# Patient Record
Sex: FEMALE | Race: BLACK OR AFRICAN AMERICAN | URBAN - METROPOLITAN AREA
[De-identification: names, ages, dates, MRNs, and addresses within clinical notes are randomized per-mention and may not be internally consistent; named-entity substitution may affect disease eponyms.]

---

## 2017-01-04 ENCOUNTER — CARE COORDINATION (OUTPATIENT)
Dept: CARDIOLOGY | Facility: CLINIC | Age: 62
End: 2017-01-04

## 2017-01-04 DIAGNOSIS — I42.9 CARDIOMYOPATHY, UNSPECIFIED (H): Primary | ICD-10-CM

## 2017-01-04 DIAGNOSIS — I34.0 MITRAL VALVE INSUFFICIENCY: ICD-10-CM

## 2017-01-04 RX ORDER — POTASSIUM CHLORIDE 1500 MG/1
40 TABLET, EXTENDED RELEASE ORAL
Status: CANCELLED | OUTPATIENT
Start: 2017-01-04

## 2017-01-04 RX ORDER — POTASSIUM CHLORIDE 1500 MG/1
20 TABLET, EXTENDED RELEASE ORAL
Status: CANCELLED | OUTPATIENT
Start: 2017-01-04

## 2017-01-04 RX ORDER — SODIUM CHLORIDE 9 MG/ML
INJECTION, SOLUTION INTRAVENOUS CONTINUOUS
Status: CANCELLED | OUTPATIENT
Start: 2017-01-04

## 2017-01-04 NOTE — PROGRESS NOTES
Patient was seen by Dr. Jossue Mirza  In clinic on Dec.19th for evaluation of HF. It was recommended that she have Coronary angiogram to evaluate cardiomyopathy and valve disease.  I had not heard back from family but spoke with them today. She is probably going to proceed with procedure after family checks with financial services.   Will place orders in Epic for when they call back to schedule

## 2017-02-17 ENCOUNTER — OFFICE VISIT (OUTPATIENT)
Dept: URGENT CARE | Facility: URGENT CARE | Age: 62
End: 2017-02-17
Payer: COMMERCIAL

## 2017-02-17 VITALS
BODY MASS INDEX: 30.42 KG/M2 | TEMPERATURE: 99.5 F | DIASTOLIC BLOOD PRESSURE: 68 MMHG | HEART RATE: 67 BPM | OXYGEN SATURATION: 97 % | SYSTOLIC BLOOD PRESSURE: 125 MMHG | WEIGHT: 161 LBS

## 2017-02-17 DIAGNOSIS — R50.9 FEVER, UNSPECIFIED: ICD-10-CM

## 2017-02-17 DIAGNOSIS — J02.0 STREP THROAT: Primary | ICD-10-CM

## 2017-02-17 LAB
DEPRECATED S PYO AG THROAT QL EIA: ABNORMAL
FLUAV+FLUBV AG SPEC QL: NORMAL
FLUAV+FLUBV AG SPEC QL: NORMAL
MICRO REPORT STATUS: ABNORMAL
SPECIMEN SOURCE: ABNORMAL
SPECIMEN SOURCE: NORMAL

## 2017-02-17 PROCEDURE — 87880 STREP A ASSAY W/OPTIC: CPT | Performed by: FAMILY MEDICINE

## 2017-02-17 PROCEDURE — 87804 INFLUENZA ASSAY W/OPTIC: CPT | Performed by: FAMILY MEDICINE

## 2017-02-17 PROCEDURE — 99213 OFFICE O/P EST LOW 20 MIN: CPT | Performed by: FAMILY MEDICINE

## 2017-02-17 RX ORDER — AMOXICILLIN 500 MG/1
500 CAPSULE ORAL 3 TIMES DAILY
Qty: 30 CAPSULE | Refills: 0 | Status: SHIPPED | OUTPATIENT
Start: 2017-02-17 | End: 2017-02-27

## 2017-02-17 NOTE — MR AVS SNAPSHOT
After Visit Summary   2/17/2017    Damon Gregory    MRN: [id number]           Patient Information     Date Of Birth          1955        Visit Information        Provider Department      2/17/2017 6:30 PM Kizzy Wong DO [facility]        Today's Diagnoses     Strep throat    -  1    Fever, unspecified          Care Instructions    Start the antibiotic tonight.    Home until Sunday morning.   New toothbrush after you've been on the antibiotic for at least 3 days.        Follow-ups after your visit        Who to contact     If you have questions or need follow up information about today's clinic visit or your schedule please contact [facility] directly at [phone].  Normal or non-critical lab and imaging results will be communicated to you by Yahoo!hart, letter or phone within 4 business days after the clinic has received the results. If you do not hear from us within 7 days, please contact the clinic through Yahoo!hart or phone. If you have a critical or abnormal lab result, we will notify you by phone as soon as possible.  Submit refill requests through EventMama or call your pharmacy and they will forward the refill request to us. Please allow 3 business days for your refill to be completed.          Additional Information About Your Visit        MyChart Information     EventMama gives you secure access to your electronic health record. If you see a primary care provider, you can also send messages to your care team and make appointments. If you have questions, please call your primary care clinic.  If you do not have a primary care provider, please call [phone] and they will assist you.        Care EveryWhere ID     This is your Care EveryWhere ID. This could be used by other organizations to access your [place] medical records  [id number]        Your Vitals Were     Pulse Temperature Pulse Oximetry BMI (Body Mass Index)          67 99.5   F (37.5  C) (Tympanic) 97% 30.42 kg/m2         Blood Pressure from Last 3 Encounters:   02/17/17 125/68   12/19/16 133/79   05/16/16 138/64    Weight from Last 3 Encounters:   02/17/17 161 lb (73 kg)   12/19/16 162 lb 6.4 oz (73.7 kg)   05/16/16 158 lb (71.7 kg)              We Performed the Following     Influenza A/B antigen     Strep, Rapid Screen          Today's Medication Changes          These changes are accurate as of: 2/17/17  8:32 PM.  If you have any questions, ask your nurse or doctor.               Start taking these medicines.        Dose/Directions    amoxicillin 500 MG capsule   Commonly known as:  AMOXIL   Used for:  Strep throat   Started by:  Kizzy Wong DO        Dose:  500 mg   Take 1 capsule (500 mg) by mouth 3 times daily for 10 days   Quantity:  30 capsule   Refills:  0         These medicines have changed or have updated prescriptions.        Dose/Directions    losartan 50 MG tablet   Commonly known as:  COZAAR   This may have changed:  how much to take   Used for:  Chronic congestive heart failure, unspecified congestive heart failure type (H)        Dose:  50 mg   Take 1 tablet (50 mg) by mouth daily   Quantity:  90 tablet   Refills:  3            Where to get your medicines      These medications were sent to [facility] [address] [facility] - [place], [place] - [address] [facility]  [address], [place] [address]     Phone:  [phone]     amoxicillin 500 MG capsule                Primary Care Provider Office Phone # Fax #    DESIREE Quiles [facility] [phone]       [facility] [address] [place] [address]        Thank you!     Thank you for choosing [facility]  for your care. Our goal is always to provide you with excellent care. Hearing back from our patients is one way we can continue to improve our services. Please take a few minutes to complete the written survey that you may receive in the mail after your  visit with us. Thank you!             Your Updated Medication List - Protect others around you: Learn how to safely use, store and throw away your medicines at www.disposemymeds.org.          This list is accurate as of: 2/17/17  8:32 PM.  Always use your most recent med list.                   Brand Name Dispense Instructions for use    amoxicillin 500 MG capsule    AMOXIL    30 capsule    Take 1 capsule (500 mg) by mouth 3 times daily for 10 days       aspirin 81 MG tablet      Take by mouth daily       furosemide 20 MG tablet    LASIX    90 tablet    Take 1 tablet (20 mg) by mouth daily Take 1 tab daily       losartan 50 MG tablet    COZAAR    90 tablet    Take 1 tablet (50 mg) by mouth daily       metoprolol 50 MG 24 hr tablet    TOPROL-XL    90 tablet    Take 1 tablet (50 mg) by mouth At Bedtime

## 2017-02-18 NOTE — PATIENT INSTRUCTIONS
Start the antibiotic tonight.    Home until Sunday morning.   New toothbrush after you've been on the antibiotic for at least 3 days.

## 2017-02-18 NOTE — NURSING NOTE
"Chief Complaint   Patient presents with     Urgent Care     Cough     c/o cough and sore throat for 5 days       Initial /68 (BP Location: Right arm, Patient Position: Chair, Cuff Size: Adult Regular)  Pulse 67  Temp 99.5  F (37.5  C) (Tympanic)  Wt 161 lb (73 kg)  SpO2 97%  BMI 30.42 kg/m2 Estimated body mass index is 30.42 kg/(m^2) as calculated from the following:    Height as of 12/19/16: 5' 1\" (1.549 m).    Weight as of this encounter: 161 lb (73 kg).  Medication Reconciliation: complete   Celena Valverde MA    "

## 2017-02-18 NOTE — PROGRESS NOTES
"SUBJECTIVE:   Damon Gregory is a 62 year old female presenting with a chief complaint of sore throat and Upper Respiratory/ENT symptoms:  Symptoms started 5 days ago and  include: feverish feeling (no temps taken at home), nasal congestion, rhinorrhea, \"cold symptoms\", cough , sore throat and myalgias  Course of illness is: same.    Treatment measures tried:  OTC meds.  Predisposing factors include:  Non smoker, no h/o asthma.     ROS:  5-Point Review of Systems Negative-- Except as stated above.    OBJECTIVE  /68 (BP Location: Right arm, Patient Position: Chair, Cuff Size: Adult Regular)  Pulse 67  Temp 99.5  F (37.5  C) (Tympanic)  Wt 161 lb (73 kg)  SpO2 97%  BMI 30.42 kg/m2  GENERAL:  Awake, alert and interactive. No acute distress.  HEENT:   NC/AT, EOMI, clear conjunctiva.  Clear nasal discharge.  Oropharynx with mild diffuse erythema,  moist and clear.  TM's and EAC's benign.  NECK: supple and free of adenopathy  CHEST:  Lungs are clear, no rhonchi, wheezing or rales. Normal symmetric air entry throughout both lung fields.   HEART:  S1 and S2 normal, no murmurs, clicks, gallops or rubs. Regular rate and rhythm.    Results for orders placed or performed in visit on 02/17/17   Strep, Rapid Screen   Result Value Ref Range    Specimen Description Throat     Rapid Strep A Screen (A)      POSITIVE: Group A Streptococcal antigen detected by immunoassay.    Micro Report Status FINAL 02/17/2017    Influenza A/B antigen   Result Value Ref Range    Influenza A/B Agn Specimen Nasopharyngeal     Influenza A  NEG     Negative   Test results must be correlated with clinical data. If necessary, results   should be confirmed by a molecular assay or viral culture.      Influenza B  NEG     Negative   Test results must be correlated with clinical data. If necessary, results   should be confirmed by a molecular assay or viral culture.           ASSESSMENT/PLAN    ICD-10-CM    1. Strep throat J02.0 amoxicillin (AMOXIL) 500 MG " capsule   2. Fever, unspecified R50.9 Strep, Rapid Screen     Influenza A/B antigen      We discussed the expected course and symptomatic cares in detail, including return to care if symptoms not improving as expected, do not resolve completely, or if any new or worsening symptoms develop.  Patient Instructions   Start the antibiotic tonight.    Home until Sunday morning.   New toothbrush after you've been on the antibiotic for at least 3 days.

## 2017-05-30 DIAGNOSIS — I50.9 CHRONIC CONGESTIVE HEART FAILURE, UNSPECIFIED CONGESTIVE HEART FAILURE TYPE: ICD-10-CM

## 2017-05-31 RX ORDER — LOSARTAN POTASSIUM 50 MG/1
50 TABLET ORAL DAILY
Qty: 30 TABLET | Refills: 1 | Status: SHIPPED | OUTPATIENT
Start: 2017-05-31 | End: 2017-10-18

## 2017-09-06 ENCOUNTER — OFFICE VISIT (OUTPATIENT)
Dept: URGENT CARE | Facility: URGENT CARE | Age: 62
End: 2017-09-06
Payer: COMMERCIAL

## 2017-09-06 VITALS
HEART RATE: 82 BPM | BODY MASS INDEX: 30.21 KG/M2 | TEMPERATURE: 99.2 F | WEIGHT: 160 LBS | DIASTOLIC BLOOD PRESSURE: 66 MMHG | OXYGEN SATURATION: 100 % | HEIGHT: 61 IN | SYSTOLIC BLOOD PRESSURE: 125 MMHG

## 2017-09-06 DIAGNOSIS — J06.9 VIRAL UPPER RESPIRATORY TRACT INFECTION: Primary | ICD-10-CM

## 2017-09-06 PROCEDURE — 99214 OFFICE O/P EST MOD 30 MIN: CPT | Performed by: PHYSICIAN ASSISTANT

## 2017-09-06 RX ORDER — AZITHROMYCIN 250 MG/1
250 TABLET, FILM COATED ORAL DAILY
Qty: 6 TABLET | Refills: 0 | Status: SHIPPED | OUTPATIENT
Start: 2017-09-06 | End: 2017-10-18

## 2017-09-06 NOTE — MR AVS SNAPSHOT
"              After Visit Summary   9/6/2017    Damon Gregory    MRN: [id number]           Patient Information     Date Of Birth          1955        Visit Information        Provider Department      9/6/2017 5:15 PM Payal Delacruz PA-C [facility]        Today's Diagnoses     Viral upper respiratory tract infection    -  1       Follow-ups after your visit        Who to contact     If you have questions or need follow up information about today's clinic visit or your schedule please contact [facility] directly at [phone].  Normal or non-critical lab and imaging results will be communicated to you by CHOOMOGOhart, letter or phone within 4 business days after the clinic has received the results. If you do not hear from us within 7 days, please contact the clinic through [employer] or phone. If you have a critical or abnormal lab result, we will notify you by phone as soon as possible.  Submit refill requests through DUHEM or call your pharmacy and they will forward the refill request to us. Please allow 3 business days for your refill to be completed.          Additional Information About Your Visit        MyChart Information     DUHEM gives you secure access to your electronic health record. If you see a primary care provider, you can also send messages to your care team and make appointments. If you have questions, please call your primary care clinic.  If you do not have a primary care provider, please call [phone] and they will assist you.        Care EveryWhere ID     This is your Care EveryWhere ID. This could be used by other organizations to access your [place] medical records  [id number]        Your Vitals Were     Pulse Temperature Height Pulse Oximetry BMI (Body Mass Index)       82 99.2  F (37.3  C) (Tympanic) 5' 1\" (1.549 m) 100% 30.23 kg/m2        Blood Pressure from Last 3 Encounters:   09/06/17 125/66   02/17/17 125/68   12/19/16 " 133/79    Weight from Last 3 Encounters:   09/06/17 160 lb (72.6 kg)   02/17/17 161 lb (73 kg)   12/19/16 162 lb 6.4 oz (73.7 kg)              Today, you had the following     No orders found for display         Today's Medication Changes          These changes are accurate as of: 9/6/17  6:13 PM.  If you have any questions, ask your nurse or doctor.               Start taking these medicines.        Dose/Directions    azithromycin 250 MG tablet   Commonly known as:  ZITHROMAX   Used for:  Viral upper respiratory tract infection   Started by:  Payal Delacruz PA-C        Dose:  250 mg   Take 1 tablet (250 mg) by mouth daily Two tabs the first day then finish remaining pack with one tab daily   Quantity:  6 tablet   Refills:  0            Where to get your medicines      These medications were sent to [facility] [address] [facility] - [place], [place] - [address] [facility]  [address], [place] [address]     Phone:  [phone]     azithromycin 250 MG tablet                Primary Care Provider Office Phone # Fax #    Aileen Pizano, DESIREE [facility] [phone]       [address] [place] [address]        Equal Access to Services     CRISTINA COOK AH: Franklyn foxo Somargy, waaxda luqadaha, qaybta kaalmada adeegyada, haley frazier. So [facility] [phone].    ATENCIÓN: Si habla español, tiene a doshi disposición servicios gratuitos de asistencia lingüística. Llame al [phone].    We comply with applicable federal civil rights laws and [place] laws. We do not discriminate on the basis of race, color, national origin, age, disability sex, sexual orientation or gender identity.            Thank you!     Thank you for choosing [facility]  for your care. Our goal is always to provide you with excellent care. Hearing back from our patients is one way we can continue to improve our services. Please take a few minutes to complete the written  survey that you may receive in the mail after your visit with us. Thank you!             Your Updated Medication List - Protect others around you: Learn how to safely use, store and throw away your medicines at www.disposemymeds.org.          This list is accurate as of: 9/6/17  6:13 PM.  Always use your most recent med list.                   Brand Name Dispense Instructions for use Diagnosis    aspirin 81 MG tablet      Take by mouth daily        azithromycin 250 MG tablet    ZITHROMAX    6 tablet    Take 1 tablet (250 mg) by mouth daily Two tabs the first day then finish remaining pack with one tab daily    Viral upper respiratory tract infection       furosemide 20 MG tablet    LASIX    90 tablet    Take 1 tablet (20 mg) by mouth daily Take 1 tab daily    Chronic congestive heart failure, unspecified congestive heart failure type (H)       losartan 50 MG tablet    COZAAR    30 tablet    Take 1 tablet (50 mg) by mouth daily . APPOINTMENT REQUIRED PRIOR TO ADD'L REFILLS.    Chronic congestive heart failure, unspecified congestive heart failure type (H)       metoprolol 50 MG 24 hr tablet    TOPROL-XL    90 tablet    Take 1 tablet (50 mg) by mouth At Bedtime    Chronic systolic congestive heart failure (H)

## 2017-09-06 NOTE — PROGRESS NOTES
"SUBJECTIVE:   Damon Gregory is a 62 year old female presenting with a chief complaint of \"cold symptoms\" x 4 days.  She presents with her  who aids in the hx.   The illness started with a sore throat and runny nose, but then moved into her chest and now she is coughing and producing phlegm, mostly clear or white.   Still has ST.   No ear pain.   No sinus pain, pressure or runny nose.   Mild fever/felt slightly feverish at home but patient is not sure bc she did not take her temperature.   Treatment measures tried include Fluids, Soups, OTC antihistamine.   Patient is actually feeling a bit better today.  is not sick.     Past Medical History:   Diagnosis Date     Asthma      Cardiomyopathy, unspecified (H) / EF40% 10/5/2015     CHF (congestive heart failure) (H)      Hyperlipidemia      Kidney stone 10/2013     Lipoma 2000     Menopause      Mitral valve insufficiency 10/5/2015     Vitamin D deficiency      Current Outpatient Prescriptions   Medication Sig Dispense Refill     azithromycin (ZITHROMAX) 250 MG tablet Take 1 tablet (250 mg) by mouth daily Two tabs the first day then finish remaining pack with one tab daily 6 tablet 0     losartan (COZAAR) 50 MG tablet Take 1 tablet (50 mg) by mouth daily . APPOINTMENT REQUIRED PRIOR TO ADD'L REFILLS. 30 tablet 1     metoprolol (TOPROL-XL) 50 MG 24 hr tablet Take 1 tablet (50 mg) by mouth At Bedtime 90 tablet 3     aspirin 81 MG tablet Take by mouth daily       furosemide (LASIX) 20 MG tablet Take 1 tablet (20 mg) by mouth daily Take 1 tab daily 90 tablet 3     Social History   Substance Use Topics     Smoking status: Never Smoker     Smokeless tobacco: Never Used     Alcohol use No       ROS:  CONSTITUTIONAL:NEGATIVE for fever, chills, change in weight    OBJECTIVE  :/66  Pulse 82  Temp 99.2  F (37.3  C) (Tympanic)  Ht 5' 1\" (1.549 m)  Wt 160 lb (72.6 kg)  SpO2 100%  BMI 30.23 kg/m2  GENERAL APPEARANCE: healthy, alert and no distress  EYES: " EOMI,  PERRL, conjunctiva clear  HENT: ear canals and TM's normal.  Nose and mouth without ulcers, erythema or lesions  NECK: supple, nontender, no lymphadenopathy  RESP: lungs clear to auscultation - no rales, rhonchi or wheezes  CV: regular rates and rhythm, normal S1 S2, no murmur noted  ABDOMEN:  soft, nontender, no HSM or masses and bowel sounds normal  NEURO: Normal strength and tone, sensory exam grossly normal,  normal speech and mentation  SKIN: no suspicious lesions or rashes    ASSESSMENT:  Upper respiratory infection  Low grade fever 2/2 above     PLAN:  1.  Patient is to use Zpack if she does not improve in 1-2 days, rx provided  2.  Continue with supportive cares including cool mist vaporizer, OTC decongestant/antihistamines, saline gargles, saline nasal spray   3.  Handout from EPIC given on tx of URIs

## 2017-09-06 NOTE — NURSING NOTE
"Chief Complaint   Patient presents with     Urgent Care     Cough     c/o cough,congestion and HA for 4 days       Initial /66  Pulse 82  Temp 99.2  F (37.3  C) (Tympanic)  Ht 5' 1\" (1.549 m)  Wt 160 lb (72.6 kg)  SpO2 100%  BMI 30.23 kg/m2 Estimated body mass index is 30.23 kg/(m^2) as calculated from the following:    Height as of this encounter: 5' 1\" (1.549 m).    Weight as of this encounter: 160 lb (72.6 kg).  Medication Reconciliation: complete   Celena Valverde MA    "

## 2017-09-27 ENCOUNTER — TELEPHONE (OUTPATIENT)
Dept: CARDIOLOGY | Facility: CLINIC | Age: 62
End: 2017-09-27

## 2017-09-27 NOTE — TELEPHONE ENCOUNTER
Nate from call center calling to get an okay to schedule an appt with Dr. Mirza on 1/22/17.  Per protocol the call center needs permission from nurse.  Please call patient with a date she can see Dr. Mirza.

## 2017-10-17 PROCEDURE — 82274 ASSAY TEST FOR BLOOD FECAL: CPT | Performed by: NURSE PRACTITIONER

## 2017-10-18 ENCOUNTER — OFFICE VISIT (OUTPATIENT)
Dept: FAMILY MEDICINE | Facility: CLINIC | Age: 62
End: 2017-10-18
Payer: COMMERCIAL

## 2017-10-18 VITALS
SYSTOLIC BLOOD PRESSURE: 158 MMHG | TEMPERATURE: 97 F | HEIGHT: 61 IN | BODY MASS INDEX: 29.83 KG/M2 | RESPIRATION RATE: 20 BRPM | OXYGEN SATURATION: 98 % | WEIGHT: 158 LBS | HEART RATE: 56 BPM | DIASTOLIC BLOOD PRESSURE: 72 MMHG

## 2017-10-18 DIAGNOSIS — Z23 NEED FOR PROPHYLACTIC VACCINATION WITH TETANUS-DIPHTHERIA (TD): ICD-10-CM

## 2017-10-18 DIAGNOSIS — E78.5 HYPERLIPIDEMIA, UNSPECIFIED HYPERLIPIDEMIA TYPE: ICD-10-CM

## 2017-10-18 DIAGNOSIS — Z00.00 ROUTINE GENERAL MEDICAL EXAMINATION AT A HEALTH CARE FACILITY: Primary | ICD-10-CM

## 2017-10-18 DIAGNOSIS — Z12.11 SCREEN FOR COLON CANCER: ICD-10-CM

## 2017-10-18 DIAGNOSIS — Z12.31 VISIT FOR SCREENING MAMMOGRAM: ICD-10-CM

## 2017-10-18 DIAGNOSIS — Z11.59 NEED FOR HEPATITIS C SCREENING TEST: ICD-10-CM

## 2017-10-18 DIAGNOSIS — I50.22 CHRONIC SYSTOLIC CONGESTIVE HEART FAILURE (H): ICD-10-CM

## 2017-10-18 DIAGNOSIS — Z23 NEED FOR PROPHYLACTIC VACCINATION AND INOCULATION AGAINST INFLUENZA: ICD-10-CM

## 2017-10-18 DIAGNOSIS — Z23 NEED FOR PROPHYLACTIC VACCINATION AGAINST STREPTOCOCCUS PNEUMONIAE (PNEUMOCOCCUS): ICD-10-CM

## 2017-10-18 DIAGNOSIS — I50.9 CHRONIC CONGESTIVE HEART FAILURE, UNSPECIFIED CONGESTIVE HEART FAILURE TYPE: ICD-10-CM

## 2017-10-18 LAB
CREAT UR-MCNC: 105 MG/DL
HCV AB SERPL QL IA: NONREACTIVE
MICROALBUMIN UR-MCNC: 8 MG/L
MICROALBUMIN/CREAT UR: 7.49 MG/G CR (ref 0–25)

## 2017-10-18 PROCEDURE — 36415 COLL VENOUS BLD VENIPUNCTURE: CPT | Performed by: NURSE PRACTITIONER

## 2017-10-18 PROCEDURE — 80061 LIPID PANEL: CPT | Performed by: NURSE PRACTITIONER

## 2017-10-18 PROCEDURE — 99396 PREV VISIT EST AGE 40-64: CPT | Performed by: NURSE PRACTITIONER

## 2017-10-18 PROCEDURE — 82043 UR ALBUMIN QUANTITATIVE: CPT | Performed by: NURSE PRACTITIONER

## 2017-10-18 PROCEDURE — 86803 HEPATITIS C AB TEST: CPT | Performed by: NURSE PRACTITIONER

## 2017-10-18 PROCEDURE — 80053 COMPREHEN METABOLIC PANEL: CPT | Performed by: NURSE PRACTITIONER

## 2017-10-18 RX ORDER — LOSARTAN POTASSIUM 50 MG/1
50 TABLET ORAL DAILY
Qty: 90 TABLET | Refills: 3 | Status: SHIPPED | OUTPATIENT
Start: 2017-10-18 | End: 2018-11-24

## 2017-10-18 RX ORDER — FUROSEMIDE 20 MG
20 TABLET ORAL DAILY
Qty: 90 TABLET | Refills: 3 | Status: SHIPPED | OUTPATIENT
Start: 2017-10-18 | End: 2019-04-16

## 2017-10-18 RX ORDER — METOPROLOL SUCCINATE 50 MG/1
50 TABLET, EXTENDED RELEASE ORAL AT BEDTIME
Qty: 90 TABLET | Refills: 3 | Status: SHIPPED | OUTPATIENT
Start: 2017-10-18 | End: 2018-12-31

## 2017-10-18 RX ORDER — LOSARTAN POTASSIUM 50 MG/1
50 TABLET ORAL DAILY
Qty: 30 TABLET | Refills: 1 | Status: CANCELLED | OUTPATIENT
Start: 2017-10-18

## 2017-10-18 NOTE — MR AVS SNAPSHOT
After Visit Summary   10/18/2017    Damon Gregory    MRN: [id number]           Patient Information     Date Of Birth          1955        Visit Information        Provider Department      10/18/2017 8:15 AM Aileen Pizano APRN CNP; [employer] [facility]        Today's Diagnoses     Routine general medical examination at a health care facility    -  1    Chronic congestive heart failure, unspecified congestive heart failure type (H)        Screen for colon cancer        Visit for screening mammogram        Need for hepatitis C screening test        Need for prophylactic vaccination and inoculation against influenza        Need for prophylactic vaccination against Streptococcus pneumoniae (pneumococcus)        Need for prophylactic vaccination with tetanus-diphtheria (TD)          Care Instructions    Keep taking your medications as prescribed.  I would recommend that you continue your appointments with the cardiologist.  Return to the clinic to see me in 6 months for a recheck, sooner if problems.          Preventive Health Recommendations  Female Ages 50 - 64    Yearly exam: See your health care provider every year in order to  o Review health changes.   o Discuss preventive care.    o Review your medicines if your doctor has prescribed any.      Get a Pap test every three years (unless you have an abnormal result and your provider advises testing more often).    If you get Pap tests with HPV test, you only need to test every 5 years, unless you have an abnormal result.     You do not need a Pap test if your uterus was removed (hysterectomy) and you have not had cancer.    You should be tested each year for STDs (sexually transmitted diseases) if you're at risk.     Have a mammogram every 1 to 2 years.    Have a colonoscopy at age 50, or have a yearly FIT test (stool test). These exams screen for colon cancer.      Have a cholesterol test every 5 years, or  more often if advised.    Have a diabetes test (fasting glucose) every three years. If you are at risk for diabetes, you should have this test more often.     If you are at risk for osteoporosis (brittle bone disease), think about having a bone density scan (DEXA).    Shots: Get a flu shot each year. Get a tetanus shot every 10 years.    Nutrition:     Eat at least 5 servings of fruits and vegetables each day.    Eat whole-grain bread, whole-wheat pasta and brown rice instead of white grains and rice.    Talk to your provider about Calcium and Vitamin D.     Lifestyle    Exercise at least 150 minutes a week (30 minutes a day, 5 days a week). This will help you control your weight and prevent disease.    Limit alcohol to one drink per day.    No smoking.     Wear sunscreen to prevent skin cancer.     See your dentist every six months for an exam and cleaning.    See your eye doctor every 1 to 2 years.            Follow-ups after your visit        Your next 10 appointments already scheduled     Dec 11, 2017  2:00 PM CST   (Arrive by 1:45 PM)   RETURN HEART FAILURE with Flash Mirza MD   [facility] ([facility])    [address] [place] [id number] [phone]              Future tests that were ordered for you today     Open Future Orders        Priority Expected Expires Ordered    MA SCREENING DIGITAL BILAT - Future  (s+30) Routine  10/18/2018 10/18/2017    Fecal colorectal cancer screen (FIT) Routine 11/8/2017 1/10/2018 10/18/2017            Who to contact     If you have questions or need follow up information about today's clinic visit or your schedule please contact [facility] directly at [phone].  Normal or non-critical lab and imaging results will be communicated to you by MyChart, letter or phone within 4 business days after the clinic has received the results. If you do not hear from us within 7 days, please contact  "the clinic through [employer] or phone. If you have a critical or abnormal lab result, we will notify you by phone as soon as possible.  Submit refill requests through [employer] or call your pharmacy and they will forward the refill request to us. Please allow 3 business days for your refill to be completed.          Additional Information About Your Visit        [employer] Information     [employer] gives you secure access to your electronic health record. If you see a primary care provider, you can also send messages to your care team and make appointments. If you have questions, please call your primary care clinic.  If you do not have a primary care provider, please call [phone] and they will assist you.        Care EveryWhere ID     This is your Care EveryWhere ID. This could be used by other organizations to access your [facility] medical records  [id number]        Your Vitals Were     Pulse Temperature Respirations Height Pulse Oximetry Breastfeeding?    56 97  F (36.1  C) (Oral) 20 5' 1\" (1.549 m) 98% No    BMI (Body Mass Index)                   29.85 kg/m2            Blood Pressure from Last 3 Encounters:   10/18/17 160/73   09/06/17 125/66   02/17/17 125/68    Weight from Last 3 Encounters:   10/18/17 158 lb (71.7 kg)   09/06/17 160 lb (72.6 kg)   02/17/17 161 lb (73 kg)              We Performed the Following     Albumin Random Urine Quantitative with Creat Ratio     Comprehensive metabolic panel     Hepatitis C Screen Reflex to HCV RNA Quant and Genotype     Lipid panel reflex to direct LDL        Primary Care Provider Office Phone # Fax #    DESIREE Quiles [facility] [phone] [address] [place] [address]        Equal Access to Services     VIVIANA COOK : Hadii tushar Melvin, tyrone fay, haley lucas. So [facility] [phone].    ATENCIÓN: Si habla español, tiene a doshi disposición servicios " mary ann de asistencia lingüística. Emili sanford [phone].    We comply with applicable federal civil rights laws and [place] laws. We do not discriminate on the basis of race, color, national origin, age, disability, sex, sexual orientation, or gender identity.            Thank you!     Thank you for choosing [facility]  for your care. Our goal is always to provide you with excellent care. Hearing back from our patients is one way we can continue to improve our services. Please take a few minutes to complete the written survey that you may receive in the mail after your visit with us. Thank you!             Your Updated Medication List - Protect others around you: Learn how to safely use, store and throw away your medicines at www.disposemymeds.org.          This list is accurate as of: 10/18/17  9:00 AM.  Always use your most recent med list.                   Brand Name Dispense Instructions for use Diagnosis    aspirin 81 MG tablet      Take by mouth daily        furosemide 20 MG tablet    LASIX    90 tablet    Take 1 tablet (20 mg) by mouth daily Take 1 tab daily    Chronic congestive heart failure, unspecified congestive heart failure type (H)       losartan 50 MG tablet    COZAAR    30 tablet    Take 1 tablet (50 mg) by mouth daily . APPOINTMENT REQUIRED PRIOR TO ADD'L REFILLS.    Chronic congestive heart failure, unspecified congestive heart failure type (H)       metoprolol 50 MG 24 hr tablet    TOPROL-XL    90 tablet    Take 1 tablet (50 mg) by mouth At Bedtime    Chronic systolic congestive heart failure (H)

## 2017-10-18 NOTE — NURSING NOTE
"Chief Complaint   Patient presents with     Physical       Initial /73  Pulse 56  Temp 97  F (36.1  C) (Oral)  Resp 20  Ht 5' 1\" (1.549 m)  Wt 158 lb (71.7 kg)  SpO2 98%  Breastfeeding? No  BMI 29.85 kg/m2 Estimated body mass index is 29.85 kg/(m^2) as calculated from the following:    Height as of this encounter: 5' 1\" (1.549 m).    Weight as of this encounter: 158 lb (71.7 kg).  Medication Reconciliation: complete    Juan Luis Watson MA       "

## 2017-10-18 NOTE — PATIENT INSTRUCTIONS
Keep taking your medications as prescribed.  I would recommend that you continue your appointments with the cardiologist.  Return to the clinic to see me in 6 months for a recheck, sooner if problems.          Preventive Health Recommendations  Female Ages 50 - 64    Yearly exam: See your health care provider every year in order to  o Review health changes.   o Discuss preventive care.    o Review your medicines if your doctor has prescribed any.      Get a Pap test every three years (unless you have an abnormal result and your provider advises testing more often).    If you get Pap tests with HPV test, you only need to test every 5 years, unless you have an abnormal result.     You do not need a Pap test if your uterus was removed (hysterectomy) and you have not had cancer.    You should be tested each year for STDs (sexually transmitted diseases) if you're at risk.     Have a mammogram every 1 to 2 years.    Have a colonoscopy at age 50, or have a yearly FIT test (stool test). These exams screen for colon cancer.      Have a cholesterol test every 5 years, or more often if advised.    Have a diabetes test (fasting glucose) every three years. If you are at risk for diabetes, you should have this test more often.     If you are at risk for osteoporosis (brittle bone disease), think about having a bone density scan (DEXA).    Shots: Get a flu shot each year. Get a tetanus shot every 10 years.    Nutrition:     Eat at least 5 servings of fruits and vegetables each day.    Eat whole-grain bread, whole-wheat pasta and brown rice instead of white grains and rice.    Talk to your provider about Calcium and Vitamin D.     Lifestyle    Exercise at least 150 minutes a week (30 minutes a day, 5 days a week). This will help you control your weight and prevent disease.    Limit alcohol to one drink per day.    No smoking.     Wear sunscreen to prevent skin cancer.     See your dentist every six months for an exam and  cleaning.    See your eye doctor every 1 to 2 years.

## 2017-10-18 NOTE — PROGRESS NOTES
SUBJECTIVE:   CC: Damon Gregory is an 62 year old woman who presents for preventive health visit.     Physical   Annual:     Getting at least 3 servings of Calcium per day::  Yes    Bi-annual eye exam::  NO    Dental care twice a year::  Yes    Sleep apnea or symptoms of sleep apnea::  None    Taking medications regularly::  Yes    Additional concerns today::  YES      Today's PHQ-2 Score:   PHQ-2 ( 1999 [employer]) 10/18/2017   Q1: Little interest or pleasure in doing things 0   Q2: Feeling down, depressed or hopeless 0   PHQ-2 Score 0   Q1: Little interest or pleasure in doing things Not at all   Q2: Feeling down, depressed or hopeless Not at all   PHQ-2 Score 0       Abuse: Current or Past(Physical, Sexual or Emotional)- No  Do you feel safe in your environment - Yes    Social History   Substance Use Topics     Smoking status: Never Smoker     Smokeless tobacco: Never Used     Alcohol use No     The patient does not drink >3 drinks per day nor >7 drinks per week.    Reviewed orders with patient.  Reviewed health maintenance and updated orders accordingly - Yes  Labs reviewed in EPIC  BP Readings from Last 3 Encounters:   10/18/17 160/73   09/06/17 125/66   02/17/17 125/68    Wt Readings from Last 3 Encounters:   10/18/17 158 lb (71.7 kg)   09/06/17 160 lb (72.6 kg)   02/17/17 161 lb (73 kg)                Patient Active Problem List   Diagnosis     Chronic congestive heart failure, unspecified congestive heart failure type (H)     Multiple joint pain     CHF (congestive heart failure) (H)     Hyperlipidemia     Cardiomyopathy, unspecified (H) / EF40%     Mitral valve insufficiency/ mod to severe     History reviewed. No pertinent surgical history.    Social History   Substance Use Topics     Smoking status: Never Smoker     Smokeless tobacco: Never Used     Alcohol use No     History reviewed. No pertinent family history.      Current Outpatient Prescriptions   Medication Sig Dispense Refill     losartan (COZAAR)  50 MG tablet Take 1 tablet (50 mg) by mouth daily . APPOINTMENT REQUIRED PRIOR TO ADD'L REFILLS. 30 tablet 1     metoprolol (TOPROL-XL) 50 MG 24 hr tablet Take 1 tablet (50 mg) by mouth At Bedtime 90 tablet 3     aspirin 81 MG tablet Take by mouth daily       furosemide (LASIX) 20 MG tablet Take 1 tablet (20 mg) by mouth daily Take 1 tab daily 90 tablet 3     No Known Allergies  Recent Labs   Lab Test  05/18/16   1223  03/09/16   0837  10/01/15   LDL   --   115*   --   117   HDL   --   41*   --   42   TRIG   --   127   --   73   ALT   --   41   --   31   CR  0.73  0.74   < >  0.73   GFRESTIMATED  81  79   --    --    GFRESTBLACK  >90   GFR Calc    >90   GFR Calc     --    --    POTASSIUM  4.2  3.9   < >  4.5    < > = values in this interval not displayed.        Patient over age 50, mutual decision to screen reflected in health maintenance.      Pertinent mammograms are reviewed under the imaging tab.  History of abnormal Pap smear: Last 3 Pap Results: No results found for: PAP  She refuses pap smears.    CHF:  She is taking her medications as prescribed (lasix, losartan, and toprol 50) as well as a baby aspirin per day.  No chest pain.  She reports she is feeling healthy.  She denies SOB with activity or pedal edema.    Breakfast today at 5am: a small amount of oatmeal.       Reviewed and updated as needed this visit by clinical staff  Tobacco  Allergies  Med Hx  Surg Hx  Fam Hx  Soc Hx        Reviewed and updated as needed this visit by Provider          Review of Systems  C: NEGATIVE for fever, chills  I: NEGATIVE for worrisome rashes, moles or lesions  E: NEGATIVE for vision changes or irritation  ENT: NEGATIVE for ear, mouth and throat problems  R: NEGATIVE for significant cough or SOB  B: NEGATIVE for masses, tenderness or discharge  CV: NEGATIVE for chest pain, palpitations or peripheral edema  GI: NEGATIVE for nausea, abdominal pain, heartburn, or change in bowel  "habits  : NEGATIVE for unusual urinary or vaginal symptoms. No vaginal bleeding.  M: NEGATIVE for significant arthralgias or myalgia  N: NEGATIVE for weakness, dizziness or paresthesias  E: NEGATIVE for temperature intolerance, skin/hair changes  P: NEGATIVE for changes in mood or affect      OBJECTIVE:   /73  Pulse 56  Temp 97  F (36.1  C) (Oral)  Resp 20  Ht 5' 1\" (1.549 m)  Wt 158 lb (71.7 kg)  SpO2 98%  Breastfeeding? No  BMI 29.85 kg/m2  Physical Exam  GENERAL: healthy, alert and no distress  EYES: Eyes grossly normal to inspection, PERRL and conjunctivae and sclerae normal  HENT: ear canals and TM's normal, nose and mouth without ulcers or lesions  NECK: no adenopathy, no asymmetry, masses, or scars and thyroid normal to palpation  RESP: lungs clear to auscultation - no rales, rhonchi or wheezes  BREAST: normal without masses, tenderness or nipple discharge and no palpable axillary masses or adenopathy  CV: regular rate and rhythm, normal S1 S2, no S3 or S4, no murmur, click or rub, no peripheral edema and peripheral pulses strong  ABDOMEN: soft, nontender, no hepatosplenomegaly, no masses and bowel sounds normal   (female): Refused by patient  MS: no gross musculoskeletal defects noted, no edema  SKIN: no suspicious lesions or rashes  NEURO: Normal strength and tone, mentation intact and speech normal  PSYCH: mentation appears normal, affect normal/bright    ASSESSMENT/PLAN:   (Z00.00) Routine general medical examination at a health care facility  (primary encounter diagnosis)  Comment: routine  Plan: Hepatitis C Screen Reflex to HCV RNA Quant and         Genotype, Lipid panel reflex to direct LDL,         Comprehensive metabolic panel, Fecal colorectal        cancer screen (FIT), Albumin Random Urine         Quantitative with Creat Ratio        Follow-up appointment with me in 6 months for check in.    (I50.9) Chronic congestive heart failure, unspecified congestive heart failure type " (H)  Comment: uncertain  Plan: Continue care with cardiology  It was noted and discussed with the patient that her blood pressure is elevated today. Per the patient, she did not take her blood pressure medication today. She thought she was not supposed to take it before her appointment. I emphasized the importance of taking her medication every day.  I did instruct her to take her medication prior to all of her doctors appointments.  She will also take her medication before her upcoming cardiology clinic and she will also take her blood pressure/medication bottles to that appointment with her.  She is to return to the clinic to see me for a follow-up appointment in 6 months, sooner if problems.    (Z12.11) Screen for colon cancer  Comment: Routine  Plan: The patient declines/refuses a colonoscopy.  She does consent to the fit test. She is to do with the FIT at earliest convenience.  If negative for blood, next FIT test in one year.  If positive for blood, referral for colonoscopy.  Patient verbalizes understanding.      (Z12.31) Visit for screening mammogram  Comment: Routine  Plan: MA SCREENING DIGITAL BILAT - Future  (s+30)        She refuses a mammogram.    (Z11.59) Need for hepatitis C screening test  Comment: Routine  Plan: Hepatitis C Screen Reflex to HCV RNA Quant and         Genotype        Screening done today    (Z23) Need for prophylactic vaccination and inoculation against influenza  Comment:   Plan: Declined today    (Z23) Need for prophylactic vaccination against Streptococcus pneumoniae (pneumococcus)  Comment:   Plan: Declined today    (Z23) Need for prophylactic vaccination with tetanus-diphtheria (TD)  Comment:   Plan: Declined today    (E78.5) Hyperlipidemia, unspecified hyperlipidemia type  Comment:   Plan: Labs pending        COUNSELING:  Reviewed preventive health counseling, as reflected in patient instructions         reports that she has never smoked. She has never used smokeless  "tobacco.    Estimated body mass index is 29.85 kg/(m^2) as calculated from the following:    Height as of this encounter: 5' 1\" (1.549 m).    Weight as of this encounter: 158 lb (71.7 kg).   Weight management plan: Discussed healthy diet and exercise guidelines and patient will follow up in 6 months in clinic to re-evaluate.    Counseling Resources:  ATP IV Guidelines  Pooled Cohorts Equation Calculator  Breast Cancer Risk Calculator  FRAX Risk Assessment  ICSI Preventive Guidelines  Dietary Guidelines for Americans, 2010  USDA's MyPlate  ASA Prophylaxis  Lung CA Screening    DESIREE Best CNP  [facility]  Answers for HPI/ROS submitted by the patient on 10/18/2017   PHQ-2 Score:   "

## 2017-10-19 LAB
ALBUMIN SERPL-MCNC: 3.8 G/DL (ref 3.4–5)
ALP SERPL-CCNC: 67 U/L (ref 40–150)
ALT SERPL W P-5'-P-CCNC: 27 U/L (ref 0–50)
ANION GAP SERPL CALCULATED.3IONS-SCNC: 7 MMOL/L (ref 3–14)
AST SERPL W P-5'-P-CCNC: 15 U/L (ref 0–45)
BILIRUB SERPL-MCNC: 0.3 MG/DL (ref 0.2–1.3)
BUN SERPL-MCNC: 17 MG/DL (ref 7–30)
CALCIUM SERPL-MCNC: 9.2 MG/DL (ref 8.5–10.1)
CHLORIDE SERPL-SCNC: 105 MMOL/L (ref 94–109)
CHOLEST SERPL-MCNC: 204 MG/DL
CO2 SERPL-SCNC: 28 MMOL/L (ref 20–32)
CREAT SERPL-MCNC: 0.71 MG/DL (ref 0.52–1.04)
GFR SERPL CREATININE-BSD FRML MDRD: 84 ML/MIN/1.7M2
GLUCOSE SERPL-MCNC: 82 MG/DL (ref 70–99)
HDLC SERPL-MCNC: 50 MG/DL
LDLC SERPL CALC-MCNC: 120 MG/DL
NONHDLC SERPL-MCNC: 154 MG/DL
POTASSIUM SERPL-SCNC: 4.4 MMOL/L (ref 3.4–5.3)
PROT SERPL-MCNC: 7.3 G/DL (ref 6.8–8.8)
SODIUM SERPL-SCNC: 140 MMOL/L (ref 133–144)
TRIGL SERPL-MCNC: 168 MG/DL

## 2017-10-20 DIAGNOSIS — Z00.00 ROUTINE GENERAL MEDICAL EXAMINATION AT A HEALTH CARE FACILITY: ICD-10-CM

## 2017-10-20 NOTE — PROGRESS NOTES
Tk Jose,    This note is to let you know the results of your recent lab studies.    Your electrolytes, blood sugar, kidney function studies, liver function studies, and calcium levels are all normal. You came back negative for hepatitis C.    Your cholesterol panel did come back slightly abnormal. I would recommend that you eat a diet low in fat and cholesterol, increase your aerobic activity, and take good care of yourself. Please return to the clinic in 6 months for recheck with me as we discussed.    Aileen RAMÍREZ CNP

## 2017-10-21 LAB — HEMOCCULT STL QL IA: NEGATIVE

## 2017-12-05 ENCOUNTER — PRE VISIT (OUTPATIENT)
Dept: CARDIOLOGY | Facility: CLINIC | Age: 62
End: 2017-12-05

## 2017-12-05 DIAGNOSIS — I50.22 CHRONIC SYSTOLIC CONGESTIVE HEART FAILURE (H): ICD-10-CM

## 2017-12-05 DIAGNOSIS — I50.9 CHRONIC CONGESTIVE HEART FAILURE, UNSPECIFIED CONGESTIVE HEART FAILURE TYPE: Primary | ICD-10-CM

## 2017-12-11 ENCOUNTER — OFFICE VISIT (OUTPATIENT)
Dept: CARDIOLOGY | Facility: CLINIC | Age: 62
End: 2017-12-11
Attending: INTERNAL MEDICINE
Payer: COMMERCIAL

## 2017-12-11 VITALS
SYSTOLIC BLOOD PRESSURE: 131 MMHG | HEIGHT: 62 IN | DIASTOLIC BLOOD PRESSURE: 85 MMHG | WEIGHT: 156 LBS | OXYGEN SATURATION: 98 % | BODY MASS INDEX: 28.71 KG/M2 | HEART RATE: 67 BPM

## 2017-12-11 DIAGNOSIS — I50.22 CHRONIC SYSTOLIC CONGESTIVE HEART FAILURE (H): ICD-10-CM

## 2017-12-11 DIAGNOSIS — I05.9 MITRAL VALVE DISEASE: Primary | ICD-10-CM

## 2017-12-11 DIAGNOSIS — I50.9 CHRONIC CONGESTIVE HEART FAILURE, UNSPECIFIED CONGESTIVE HEART FAILURE TYPE: ICD-10-CM

## 2017-12-11 PROCEDURE — 99213 OFFICE O/P EST LOW 20 MIN: CPT | Mod: 25,ZF

## 2017-12-11 PROCEDURE — 93005 ELECTROCARDIOGRAM TRACING: CPT | Mod: ZF

## 2017-12-11 PROCEDURE — 99213 OFFICE O/P EST LOW 20 MIN: CPT | Mod: ZP | Performed by: INTERNAL MEDICINE

## 2017-12-11 PROCEDURE — 93010 ELECTROCARDIOGRAM REPORT: CPT | Mod: ZP | Performed by: INTERNAL MEDICINE

## 2017-12-11 ASSESSMENT — PAIN SCALES - GENERAL: PAINLEVEL: NO PAIN (0)

## 2017-12-11 NOTE — PATIENT INSTRUCTIONS
Schedule Echo in near future  Schedule new appointment for consult on Mitral Clip procedure.    Lara Miguel, RN  Cardiology Care Coordinator  Please be aware that I work part-time but I will be checking messages several times per week.   For urgent needs, please call the number below.    [phone], press 1 for [employer], press 3 to speak to a nurse    .

## 2017-12-11 NOTE — NURSING NOTE
Cardiac Testing: Patient given instructions regarding  echocardiogram . Discussed purpose, preparation, procedure and when to expect results reported back to the patient. Patient demonstrated understanding of this information and agreed to call with further questions or concerns.  Return Appointment: Patient given instructions regarding scheduling next clinic visit. Patient demonstrated understanding of this information and agreed to call with further questions or concerns.  Patient stated she understood all health information given and agreed to call with further questions or concerns.

## 2017-12-11 NOTE — NURSING NOTE
Chief Complaint   Patient presents with     Follow Up For     one yr. follow up for HF, CAD, EKG       Vitals were taken and medications were reconciled. EKG was performed.    Asia Thomson MA    2:13 PM

## 2017-12-11 NOTE — MR AVS SNAPSHOT
After Visit Summary   12/11/2017    Damon Gregory    MRN: [id number]           Patient Information     Date Of Birth          1955        Visit Information        Provider Department      12/11/2017 1:45 PM Flash Mirza MD; VIDEO,  [facility]        Today's Diagnoses     Mitral valve disease    -  1    Chronic congestive heart failure, unspecified congestive heart failure type (H)        Chronic systolic congestive heart failure (H)          Care Instructions    Schedule Echo in near future  Schedule new appointment for consult on Mitral Clip procedure.    Lara Miguel, RN  Cardiology Care Coordinator  Please be aware that I work part-time but I will be checking messages several times per week.   For urgent needs, please call the number below.    [phone], press 1 for [employer], press 3 to speak to a nurse    .          Follow-ups after your visit        Additional Services     Follow-Up with Cardiologist                 Your next 10 appointments already scheduled     Tyron 15, 2018  9:30 AM CST   Ech Complete with [address] Echo ([facility])    [address] [place] [id number] [phone]           1. Please bring or wear a comfortable two-piece outfit. 2. You may eat, drink and take your normal medicines. 3. For any questions that cannot be answered, please contact the ordering physician              Future tests that were ordered for you today     Open Future Orders        Priority Expected Expires Ordered    Follow-Up with Cardiologist Routine 11/1/2018 12/28/2018 12/11/2017    Echocardiogram Routine 12/18/2017 7/18/2018 12/11/2017            Who to contact     If you have questions or need follow up information about today's clinic visit or your schedule please contact [facility] directly at [phone].  Normal or non-critical lab and imaging results will be communicated to you by Dannielle  "letter or phone within 4 business days after the clinic has received the results. If you do not hear from us within 7 days, please contact the clinic through [employer] or phone. If you have a critical or abnormal lab result, we will notify you by phone as soon as possible.  Submit refill requests through [employer] or call your pharmacy and they will forward the refill request to us. Please allow 3 business days for your refill to be completed.          Additional Information About Your Visit        [employer] Information     [employer] gives you secure access to your electronic health record. If you see a primary care provider, you can also send messages to your care team and make appointments. If you have questions, please call your primary care clinic.  If you do not have a primary care provider, please call [phone] and they will assist you.        Care EveryWhere ID     This is your Care EveryWhere ID. This could be used by other organizations to access your [place] medical records  [id number]        Your Vitals Were     Pulse Height Pulse Oximetry BMI (Body Mass Index)          67 1.562 m (5' 1.5\") 98% 29 kg/m2         Blood Pressure from Last 3 Encounters:   12/11/17 131/85   10/18/17 158/72   09/06/17 125/66    Weight from Last 3 Encounters:   12/11/17 70.8 kg (156 lb)   10/18/17 71.7 kg (158 lb)   09/06/17 72.6 kg (160 lb)              We Performed the Following     EKG 12-lead, tracing only (Future)        Primary Care Provider Office Phone # Fax #    Aileen TAMY Pizano APRN [facility] [phone] [address] [place] [address]        Equal Access to Services     CRISTINA COOK : Hadii tushar borja Somargy, waaxda luqadaha, qaybta kaalmada haley ortega. So [facility] [phone].    ATENCIÓN: Si habla español, tiene a doshi disposición servicios gratuitos de asistencia lingüística. Llame al [phone].    We comply with applicable federal civil " rights laws and [place] laws. We do not discriminate on the basis of race, color, national origin, age, disability, sex, sexual orientation, or gender identity.            Thank you!     Thank you for choosing [facility]  for your care. Our goal is always to provide you with excellent care. Hearing back from our patients is one way we can continue to improve our services. Please take a few minutes to complete the written survey that you may receive in the mail after your visit with us. Thank you!             Your Updated Medication List - Protect others around you: Learn how to safely use, store and throw away your medicines at www.disposemymeds.org.          This list is accurate as of: 12/11/17  3:18 PM.  Always use your most recent med list.                   Brand Name Dispense Instructions for use Diagnosis    aspirin 81 MG tablet     100 tablet    Take 1 tablet (81 mg) by mouth daily    Chronic systolic congestive heart failure (H)       furosemide 20 MG tablet    LASIX    90 tablet    Take 1 tablet (20 mg) by mouth daily Take 1 tab daily    Chronic congestive heart failure, unspecified congestive heart failure type (H)       losartan 50 MG tablet    COZAAR    90 tablet    Take 1 tablet (50 mg) by mouth daily    Chronic congestive heart failure, unspecified congestive heart failure type (H)       metoprolol 50 MG 24 hr tablet    TOPROL-XL    90 tablet    Take 1 tablet (50 mg) by mouth At Bedtime    Chronic systolic congestive heart failure (H)       UNABLE TO FIND      daily MEDICATION NAME: Vitamin D with something for joints        VITAMIN B 12 PO      Take 1,000 mcg by mouth daily

## 2017-12-11 NOTE — PROGRESS NOTES
HPI: 63 yo female with moderate to severe MR with cardiomyopathy (EF 40%) in pt who has been reluctant to obtain further angiographic assessment and repair of valve.  Pt with symptomatic MR treated with diuretics.  Denies new CP, SOB, LOMELI but lives primarily a sedentary lifestyle.  Denies tobacco abuse.  Denies new palpitations, presyncope or syncope.  Hx obtained from daughter who also serves as a .      PAST MEDICAL HISTORY:  Past Medical History:   Diagnosis Date     Asthma      Cardiomyopathy, unspecified (H) / EF40% 10/5/2015     CHF (congestive heart failure) (H)      Hyperlipidemia      Kidney stone 10/2013     Lipoma 2000     Menopause      Mitral valve insufficiency 10/5/2015     Vitamin D deficiency        FAMILY HISTORY:  No family history on file.    SOCIAL HISTORY:  Social History     Social History     Marital status:      Spouse name: N/A     Number of children: N/A     Years of education: N/A     Social History Main Topics     Smoking status: Never Smoker     Smokeless tobacco: Never Used     Alcohol use No     Drug use: No     Sexual activity: Yes     Partners: Male     Birth control/ protection: None     Other Topics Concern     Parent/Sibling W/ Cabg, Mi Or Angioplasty Before 65f 55m? No     Caffeine Concern Yes     Exercise Yes     walking     Social History Narrative       CURRENT MEDICATIONS:  Current Outpatient Prescriptions   Medication Sig Dispense Refill     Cyanocobalamin (VITAMIN B 12 PO) Take 1,000 mcg by mouth daily       UNABLE TO FIND daily MEDICATION NAME: Vitamin D with something for joints       losartan (COZAAR) 50 MG tablet Take 1 tablet (50 mg) by mouth daily 90 tablet 3     metoprolol (TOPROL-XL) 50 MG 24 hr tablet Take 1 tablet (50 mg) by mouth At Bedtime 90 tablet 3     aspirin 81 MG tablet Take 1 tablet (81 mg) by mouth daily 100 tablet 3     furosemide (LASIX) 20 MG tablet Take 1 tablet (20 mg) by mouth daily Take 1 tab daily 90 tablet 3       ROS:  "  Constitutional: No fever, chills, or sweats. No weight gain/loss.   ENT: No visual disturbance, ear ache, epistaxis, sore throat.   Allergies/Immunologic: Negative.   Respiratory: No cough, hemoptysis.   Cardiovascular: As per HPI.   GI: No nausea, vomiting, hematemesis, melena, or hematochezia.   : No urinary frequency, dysuria, or hematuria.   Integument: Negative.   Psychiatric: Negative.   Neuro: Negative.   Endocrinology: Negative.   Musculoskeletal: Negative.    EXAM:  /85 (BP Location: Left arm, Patient Position: Chair, Cuff Size: Adult Regular)  Pulse 67  Ht 1.562 m (5' 1.5\")  Wt 70.8 kg (156 lb)  SpO2 98%  BMI 29 kg/m2  General: appears comfortable, alert and articulate  Head: normocephalic, atraumatic  Eyes: anicteric sclera, EOMI  Neck: no adenopathy  Orophyarynx: moist mucosa, no lesions, dentition intact  Heart: regular, S1/S2, soft 2/VI HSM, gallop, rub, estimated JVP 8 cm  Lungs: clear, no rales or wheezing  Abdomen: soft, non-tender, bowel sounds present, no hepatosplenomegaly  Extremities: no clubbing, cyanosis or edema  Neurological: normal speech and affect, no gross motor deficits    Labs:  CBC RESULTS:  Lab Results   Component Value Date    WBC 5.6 03/09/2016    RBC 3.93 03/09/2016    HGB 10.9 (L) 03/09/2016    HCT 33.7 (L) 03/09/2016    MCV 86 03/09/2016    MCH 27.7 03/09/2016    MCHC 32.3 03/09/2016    RDW 13.0 03/09/2016     03/09/2016       CMP RESULTS:  Lab Results   Component Value Date     10/18/2017    POTASSIUM 4.4 10/18/2017    CHLORIDE 105 10/18/2017    CO2 28 10/18/2017    ANIONGAP 7 10/18/2017    GLC 82 10/18/2017    BUN 17 10/18/2017    CR 0.71 10/18/2017    GFRESTIMATED 84 10/18/2017    GFRESTBLACK >90 10/18/2017    MIKE 9.2 10/18/2017    BILITOTAL 0.3 10/18/2017    ALBUMIN 3.8 10/18/2017    ALKPHOS 67 10/18/2017    ALT 27 10/18/2017    AST 15 10/18/2017        INR RESULTS:  No results found for: INR    No results found for: MAG  No results found for: " NTBNPI  Lab Results   Component Value Date    NTBNP 254 (H) 03/09/2016       Assessment and Plan:   Pt with moderate to severe MR who has refused TTE and angiography in past now more receptive to therapeutic and diagnostic tests.  Plan to obtain TTE today and [facility].  Plan discussed with pt and family and all are agreeable.     Flash Mirza MD, PhD  Professor, Heart Failure and Cardiac Transplantation  [facility]    CC  Patient Care Team:  Aileen Pizano APRN CNP as PCP - General (Nurse Practitioner)  Flash Mirza MD as MD (Cardiology)  SELF, REFERRED

## 2017-12-12 LAB — INTERPRETATION ECG - MUSE: NORMAL

## 2017-12-13 ENCOUNTER — TELEPHONE (OUTPATIENT)
Dept: FAMILY MEDICINE | Facility: CLINIC | Age: 62
End: 2017-12-13

## 2017-12-13 NOTE — TELEPHONE ENCOUNTER
Advised pt that she must be seen in [facility] or do an evisit for a rx  Pt doesn't have diarrhea now, just wanting rx for travel  Pt in agreement with plan and verbalized understanding.  Thanks!  Erin, KATELYN  Triage Nurse

## 2017-12-13 NOTE — TELEPHONE ENCOUNTER
Reason for Call:  Medication or medication refill:    Do you use a [facility]?  Name of the pharmacy and phone number for the current request:  [facility]    Name of the medication requested: Anti Diarrhea    Other request: Pt is leaving the country tomorrow morning. Please contact pt for any questions. Thanks!    Can we leave a detailed message on this number? YES    Phone number patient can be reached at: [phone]    Best Time: Anytime    Call taken on 12/13/2017 at 7:51 AM by Octavia Sun

## 2017-12-31 ENCOUNTER — HEALTH MAINTENANCE LETTER (OUTPATIENT)
Age: 62
End: 2017-12-31

## 2018-05-04 ENCOUNTER — OFFICE VISIT (OUTPATIENT)
Dept: FAMILY MEDICINE | Facility: CLINIC | Age: 63
End: 2018-05-04
Payer: COMMERCIAL

## 2018-05-04 VITALS
TEMPERATURE: 101.2 F | WEIGHT: 157.25 LBS | DIASTOLIC BLOOD PRESSURE: 67 MMHG | BODY MASS INDEX: 29.23 KG/M2 | SYSTOLIC BLOOD PRESSURE: 116 MMHG | OXYGEN SATURATION: 96 % | RESPIRATION RATE: 16 BRPM | HEART RATE: 73 BPM

## 2018-05-04 DIAGNOSIS — J06.9 UPPER RESPIRATORY TRACT INFECTION, UNSPECIFIED TYPE: Primary | ICD-10-CM

## 2018-05-04 PROCEDURE — 99213 OFFICE O/P EST LOW 20 MIN: CPT | Performed by: FAMILY MEDICINE

## 2018-05-04 RX ORDER — AZITHROMYCIN 250 MG/1
TABLET, FILM COATED ORAL
Qty: 6 TABLET | Refills: 0 | Status: SHIPPED | OUTPATIENT
Start: 2018-05-04 | End: 2018-05-19

## 2018-05-04 NOTE — MR AVS SNAPSHOT
After Visit Summary   5/4/2018    Damon Gregory    MRN: [id number]           Patient Information     Date Of Birth          1955        Visit Information        Provider Department      5/4/2018 8:45 AM Molly George MD; PHONE,  [facility]        Today's Diagnoses     Upper respiratory tract infection, unspecified type    -  1       Follow-ups after your visit        Follow-up notes from your care team     Return if symptoms worsen or fail to improve.      Who to contact     If you have questions or need follow up information about today's clinic visit or your schedule please contact [facility] directly at [phone].  Normal or non-critical lab and imaging results will be communicated to you by MyChart, letter or phone within 4 business days after the clinic has received the results. If you do not hear from us within 7 days, please contact the clinic through [employer] or phone. If you have a critical or abnormal lab result, we will notify you by phone as soon as possible.  Submit refill requests through [employer] or call your pharmacy and they will forward the refill request to us. Please allow 3 business days for your refill to be completed.          Additional Information About Your Visit        MyChart Information     [employer] gives you secure access to your electronic health record. If you see a primary care provider, you can also send messages to your care team and make appointments. If you have questions, please call your primary care clinic.  If you do not have a primary care provider, please call [phone] and they will assist you.        Care EveryWhere ID     This is your Care EveryWhere ID. This could be used by other organizations to access your [place] medical records  [id number]        Your Vitals Were     Pulse Temperature Respirations Last Period Pulse Oximetry Breastfeeding?    73 101.2  F (38.4  C) (Oral)  16 (LMP Unknown) 96% No    BMI (Body Mass Index)                   29.23 kg/m2            Blood Pressure from Last 3 Encounters:   05/04/18 116/67   12/11/17 131/85   10/18/17 158/72    Weight from Last 3 Encounters:   05/04/18 157 lb 4 oz (71.3 kg)   12/11/17 156 lb (70.8 kg)   10/18/17 158 lb (71.7 kg)              Today, you had the following     No orders found for display         Today's Medication Changes          These changes are accurate as of 5/4/18  9:27 AM.  If you have any questions, ask your nurse or doctor.               Start taking these medicines.        Dose/Directions    azithromycin 250 MG tablet   Commonly known as:  ZITHROMAX   Used for:  Upper respiratory tract infection, unspecified type   Started by:  Molly George MD        Two tablets first day, then one tablet daily for four days.   Quantity:  6 tablet   Refills:  0            Where to get your medicines      These medications were sent to [facility] [id number] [facility] - [place], [place] - [address] [facility]  [address], [place] [address]     Phone:  [phone]     azithromycin 250 MG tablet                Primary Care Provider Office Phone # Fax #    DESIREE Quiles [facility] [phone]       [facility] [place] [address]        Equal Access to Services     CRISTINA COOK AH: Hadii tushar burnett hadasho Somargy, waaxda luqadaha, qaybta kaalmada adeashada, haley frazier. So [facility] [phone].    ATENCIÓN: Si habla español, tiene a doshi disposición servicios gratuitos de asistencia lingüística. Emili al [phone].    We comply with applicable federal civil rights laws and [place] laws. We do not discriminate on the basis of race, color, national origin, age, disability, sex, sexual orientation, or gender identity.            Thank you!     Thank you for choosing [facility]  for your care. Our goal is always to provide you with excellent care.  Hearing back from our patients is one way we can continue to improve our services. Please take a few minutes to complete the written survey that you may receive in the mail after your visit with us. Thank you!             Your Updated Medication List - Protect others around you: Learn how to safely use, store and throw away your medicines at www.disposemymeds.org.          This list is accurate as of 5/4/18  9:27 AM.  Always use your most recent med list.                   Brand Name Dispense Instructions for use Diagnosis    aspirin 81 MG tablet     100 tablet    Take 1 tablet (81 mg) by mouth daily    Chronic systolic congestive heart failure (H)       azithromycin 250 MG tablet    ZITHROMAX    6 tablet    Two tablets first day, then one tablet daily for four days.    Upper respiratory tract infection, unspecified type       furosemide 20 MG tablet    LASIX    90 tablet    Take 1 tablet (20 mg) by mouth daily Take 1 tab daily    Chronic congestive heart failure, unspecified congestive heart failure type (H)       losartan 50 MG tablet    COZAAR    90 tablet    Take 1 tablet (50 mg) by mouth daily    Chronic congestive heart failure, unspecified congestive heart failure type (H)       metoprolol succinate 50 MG 24 hr tablet    TOPROL-XL    90 tablet    Take 1 tablet (50 mg) by mouth At Bedtime    Chronic systolic congestive heart failure (H)       UNABLE TO FIND      daily MEDICATION NAME: Vitamin D with something for joints        VITAMIN B 12 PO      Take 1,000 mcg by mouth daily

## 2018-05-04 NOTE — PROGRESS NOTES
SUBJECTIVE:   Damon Gregory is a 63 year old female who presents to clinic today for the following health issues:    RESPIRATORY SYMPTOMS      Duration: 3 days     Description  Cough, coughing up mucus, fever, sore throat, and  Chest pain from coughing    Accompanying signs and symptoms: headache and body ache     History (predisposing factors):  none    Therapies tried and outcome:  Tylenol and cough drop - did not help.    Here with daughter who serves as  for patient today.  Patient lives with daughter.    She notes a 3 day history of worsening cough and sputum production.  OTC cough syrup not helping.  Using Tylenol.  New fever today.  Increased body aches.  No ST or ear pain.  Tolerating po.  No N/V, abdominal pain or change in bowels.  Otherwise healthy.    No sick contacts.    Problem list and histories reviewed & adjusted, as indicated.  Additional history: as documented    Patient Active Problem List   Diagnosis     Chronic congestive heart failure, unspecified congestive heart failure type (H)     Multiple joint pain     Hyperlipidemia     Cardiomyopathy, unspecified (H) / EF40%     Mitral valve insufficiency/ mod to severe     History reviewed. No pertinent surgical history.    Social History   Substance Use Topics     Smoking status: Never Smoker     Smokeless tobacco: Never Used     Alcohol use No     History reviewed. No pertinent family history.      Current Outpatient Prescriptions   Medication Sig Dispense Refill     aspirin 81 MG tablet Take 1 tablet (81 mg) by mouth daily 100 tablet 3     azithromycin (ZITHROMAX) 250 MG tablet Two tablets first day, then one tablet daily for four days. 6 tablet 0     Cyanocobalamin (VITAMIN B 12 PO) Take 1,000 mcg by mouth daily       furosemide (LASIX) 20 MG tablet Take 1 tablet (20 mg) by mouth daily Take 1 tab daily 90 tablet 3     losartan (COZAAR) 50 MG tablet Take 1 tablet (50 mg) by mouth daily 90 tablet 3     metoprolol (TOPROL-XL) 50 MG 24 hr  tablet Take 1 tablet (50 mg) by mouth At Bedtime 90 tablet 3     UNABLE TO FIND daily MEDICATION NAME: Vitamin D with something for joints       No Known Allergies  BP Readings from Last 3 Encounters:   05/04/18 116/67   12/11/17 131/85   10/18/17 158/72    Wt Readings from Last 3 Encounters:   05/04/18 157 lb 4 oz (71.3 kg)   12/11/17 156 lb (70.8 kg)   10/18/17 158 lb (71.7 kg)                    Reviewed and updated as needed this visit by clinical staff  Tobacco  Allergies  Meds  Med Hx  Surg Hx  Fam Hx  Soc Hx      Reviewed and updated as needed this visit by Provider         ROS:  Constitutional, HEENT, cardiovascular, pulmonary, gi and gu systems are negative, except as otherwise noted.    OBJECTIVE:     /67  Pulse 73  Temp 101.2  F (38.4  C) (Oral)  Resp 16  Wt 157 lb 4 oz (71.3 kg)  LMP  (LMP Unknown)  SpO2 96%  Breastfeeding? No  BMI 29.23 kg/m2  Body mass index is 29.23 kg/(m^2).  GENERAL: healthy, alert and no distress  EYES: Eyes grossly normal to inspection, PERRL and conjunctivae and sclerae normal  HENT: ear canals and TM's normal, nose and mouth without ulcers or lesions  NECK: no adenopathy, no asymmetry, masses, or scars and thyroid normal to palpation  RESP: lungs clear to auscultation - no rales or wheezes, few scattered rhonchi, harsh dry cough  CV: regular rate and rhythm, normal S1 S2, no S3 or S4, no murmur, click or rub, no peripheral edema and peripheral pulses strong  ABDOMEN: soft, nontender, no hepatosplenomegaly, no masses and bowel sounds normal  MS: no gross musculoskeletal defects noted, no edema  SKIN: no suspicious lesions or rashes  PSYCH: mentation appears normal, affect normal/bright        ASSESSMENT/PLAN:     1. Upper respiratory tract infection, unspecified type    - azithromycin (ZITHROMAX) 250 MG tablet; Two tablets first day, then one tablet daily for four days.  Dispense: 6 tablet; Refill: 0    Treat with Zpak.  Honey prn cough.  Increase fluids.   Close FU if no change or worsening symptoms prn.    Molly George MD  [facility]

## 2018-05-18 ENCOUNTER — HOSPITAL ENCOUNTER (EMERGENCY)
Facility: CLINIC | Age: 63
Discharge: HOME | End: 2018-05-19
Attending: EMERGENCY MEDICINE | Admitting: EMERGENCY MEDICINE
Payer: COMMERCIAL

## 2018-05-18 ENCOUNTER — APPOINTMENT (OUTPATIENT)
Dept: GENERAL RADIOLOGY | Facility: CLINIC | Age: 63
End: 2018-05-18
Attending: EMERGENCY MEDICINE
Payer: COMMERCIAL

## 2018-05-18 DIAGNOSIS — R05.9 COUGH: ICD-10-CM

## 2018-05-18 LAB
CO2 BLDCOV-SCNC: 24 MMOL/L (ref 21–28)
HCG UR QL: NEGATIVE
INTERNAL QC OK POCT: YES
LACTATE BLD-SCNC: 0.7 MMOL/L (ref 0.7–2.1)
PCO2 BLDV: 38 MM HG (ref 40–50)
PH BLDV: 7.42 PH (ref 7.32–7.43)
PO2 BLDV: 31 MM HG (ref 25–47)
SAO2 % BLDV FROM PO2: 61 %

## 2018-05-18 PROCEDURE — 84484 ASSAY OF TROPONIN QUANT: CPT | Performed by: EMERGENCY MEDICINE

## 2018-05-18 PROCEDURE — 82803 BLOOD GASES ANY COMBINATION: CPT

## 2018-05-18 PROCEDURE — 99284 EMERGENCY DEPT VISIT MOD MDM: CPT | Mod: 25

## 2018-05-18 PROCEDURE — 83605 ASSAY OF LACTIC ACID: CPT

## 2018-05-18 PROCEDURE — 81025 URINE PREGNANCY TEST: CPT | Performed by: EMERGENCY MEDICINE

## 2018-05-18 PROCEDURE — 71046 X-RAY EXAM CHEST 2 VIEWS: CPT

## 2018-05-18 PROCEDURE — 83880 ASSAY OF NATRIURETIC PEPTIDE: CPT | Performed by: EMERGENCY MEDICINE

## 2018-05-18 PROCEDURE — 99284 EMERGENCY DEPT VISIT MOD MDM: CPT | Mod: Z6 | Performed by: EMERGENCY MEDICINE

## 2018-05-18 PROCEDURE — 80048 BASIC METABOLIC PNL TOTAL CA: CPT | Performed by: EMERGENCY MEDICINE

## 2018-05-18 PROCEDURE — 85025 COMPLETE CBC W/AUTO DIFF WBC: CPT | Performed by: EMERGENCY MEDICINE

## 2018-05-18 NOTE — ED AVS SNAPSHOT
[facility], Emergency Department    [address]    [facility] [place] [facility] [place] [place] [address]    Phone:  [phone]    Fax:  [phone]                                       Damon Gregory   MRN: [id number]    Department:  [facility], Emergency Department   Date of Visit:  5/18/2018           Patient Information     Date Of Birth          1955        Your diagnoses for this visit were:     Cough        You were seen by Sybil King MD.        Discharge Instructions       FOLLOW-UP:  Please make an appointment to follow up with:  - Your Primary Care Provider in 2-3 days if not improving.   --- If you are still having symptoms, they may need to try other medications or perform other tests, such as for influenza, whooping cough, or other breathing tests or imaging.   - Return to the Emergency Department with any new or worsening symptoms.     PRESCRIPTIONS / MEDICATIONS:  - Take the prescribed antibiotics until completed or told otherwise by a medical provider.  - Minimize strenuous physical activity while on this antibiotic, and notify a medical provider if you develop any joint/tendon pain, develop any palpitations, lightheadedness, etc.    RETURN TO THE EMERGENCY DEPARTMENT  Return to the Emergency Department at any time for new/worsening symptoms.       Pneumonia (Adult)  Pneumonia is an infection deep within the lungs. It is in the small air sacs (alveoli). Pneumonia may be caused by a virus or bacteria. Pneumonia caused by bacteria is usually treated with an antibiotic. Severe cases may need to be treated in the hospital. Milder cases can be treated at home. Symptoms usually start to get better during the first 2 days of treatment.    Home care  Follow these guidelines when caring for yourself at home:    Rest at home for the first 2 to 3 days, or until you feel stronger. Don t let yourself get overly tired when you go back to your activities.    Stay away from cigarette smoke - yours or other people s.    You  may use acetaminophen or ibuprofen to control fever or pain, unless another medicine was prescribed. If you have chronic liver or kidney disease, talk with your healthcare provider before using these medicines. Also talk with your provider if you ve had a stomach ulcer or gastrointestinal bleeding. Don t give aspirin to anyone younger than 18 years of age who is ill with a fever. It may cause severe liver damage.    Your appetite may be poor, so a light diet is fine.    Drink 6 to 8 glasses of fluids every day to make sure you are getting enough fluids. Beverages can include water, sport drinks, sodas without caffeine, juices, tea, or soup. Fluids will help loosen secretions in the lung. This will make it easier for you to cough up the phlegm (sputum). If you also have heart or kidney disease, check with your healthcare provider before you drink extra fluids.    Take antibiotic medicine prescribed until it is all gone, even if you are feeling better after a few days.  Follow-up care  Follow up with your healthcare provider in the next 2 to 3 days, or as advised. This is to be sure the medicine is helping you get better.  If you are 65 or older, you should get a pneumococcal vaccine and a yearly flu (influenza) shot. You should also get these vaccines if you have chronic lung disease like asthma, emphysema, or COPD. Recently, a second type of pneumonia vaccine has become available for everyone over 65 years old. This is in addition to the previous vaccine. Ask your provider about this.  When to seek medical advice  Call your healthcare provider right away if any of these occur:    You don t get better within the first 48 hours of treatment    Shortness of breath gets worse    Rapid breathing (more than 25 breaths per minute)    Coughing up blood    Chest pain gets worse with breathing    Fever of 100.4 F (38 C) or higher that doesn t get better with fever medicine    Weakness, dizziness, or fainting that gets  worse    Thirst or dry mouth that gets worse    Sinus pain, headache, or a stiff neck    Chest pain not caused by coughing  Date Last Reviewed: 1/1/2017 2000-2017 The "[employer]. [address] [place] [address], [place] [address]. All rights reserved. This information is not intended as a substitute for professional medical care. Always follow your healthcare professional's instructions.            24 Hour Appointment Hotline       To make an appointment at any [facility], call [phone] ([phone]). If you don't have a family doctor or clinic, we will help you find one. [facility] clinics are conveniently located to serve the needs of you and your family.             Review of your medicines      START taking        Dose / Directions Last dose taken    levofloxacin 750 MG tablet   Commonly known as:  LEVAQUIN   Dose:  750 mg   Quantity:  10 tablet        Take 1 tablet (750 mg) by mouth daily for 10 days   Refills:  0          Our records show that you are taking the medicines listed below. If these are incorrect, please call your family doctor or clinic.        Dose / Directions Last dose taken    aspirin 81 MG tablet   Dose:  81 mg   Quantity:  100 tablet        Take 1 tablet (81 mg) by mouth daily   Refills:  3        furosemide 20 MG tablet   Commonly known as:  LASIX   Dose:  20 mg   Quantity:  90 tablet        Take 1 tablet (20 mg) by mouth daily Take 1 tab daily   Refills:  3        losartan 50 MG tablet   Commonly known as:  COZAAR   Dose:  50 mg   Quantity:  90 tablet        Take 1 tablet (50 mg) by mouth daily   Refills:  3        metoprolol succinate 50 MG 24 hr tablet   Commonly known as:  TOPROL-XL   Dose:  50 mg   Quantity:  90 tablet        Take 1 tablet (50 mg) by mouth At Bedtime   Refills:  3        UNABLE TO FIND        daily MEDICATION NAME: Vitamin D with something for joints   Refills:  0        VITAMIN B 12 PO   Dose:  1000 mcg        Take 1,000 mcg by mouth daily   Refills:   0                Prescriptions were sent or printed at these locations (1 Prescription)                   Other Prescriptions                Printed at Department/Unit printer (1 of 1)         levofloxacin (LEVAQUIN) 750 MG tablet                Procedures and tests performed during your visit     Basic metabolic panel    CBC with platelets differential    ISTAT CG4 gases lactate anjelica nursing POCT    ISTAT gases lactate anjelica POCT    Nt probnp inpatient    Troponin I    XR Chest 2 Views    hCG qual urine POCT      Orders Needing Specimen Collection     Ordered          05/18/18 2329  Rapid strep screen - STAT, Prio: STAT, Needs to be Collected     Scheduled Task Status   05/18/18 2327 Collect Rapid strep screen Open   Order Class:  PCU Collect                  Pending Results     Date and Time Order Name Status Description    5/18/2018 2305 XR Chest 2 Views Preliminary             Pending Culture Results     No orders found for last 3 day(s).            Pending Results Instructions     If you had any lab results that were not finalized at the time of your Discharge, you can call the ED Lab Result RN at [phone]. You will be contacted by this team for any positive Lab results or changes in treatment. The nurses are available 7 days a week from 10A to 6:30P.  You can leave a message 24 hours per day and they will return your call.        Thank you for choosing [facility]       Thank you for choosing [facility] for your care. Our goal is always to provide you with excellent care. Hearing back from our patients is one way we can continue to improve our services. Please take a few minutes to complete the written survey that you may receive in the mail after you visit with us. Thank you!        [employer] Information     [employer] gives you secure access to your electronic health record. If you see a primary care provider, you can also send messages to your care team and make appointments. If you have questions, please call your  primary care clinic.  If you do not have a primary care provider, please call [phone] and they will assist you.        Care EveryWhere ID     This is your Care EveryWhere ID. This could be used by other organizations to access your [facility] medical records  [id number]        Equal Access to Services     CRISTINA COOK : Franklyn Melvin, tyrone fay, marie ortega, haley frazier. So [facility] [phone].    ATENCIÓN: Si habla español, tiene a doshi disposición servicios gratuitos de asistencia lingüística. Llame al [phone].    We comply with applicable federal civil rights laws and [place] laws. We do not discriminate on the basis of race, color, national origin, age, disability, sex, sexual orientation, or gender identity.            After Visit Summary       This is your record. Keep this with you and show to your community pharmacist(s) and doctor(s) at your next visit.

## 2018-05-18 NOTE — ED AVS SNAPSHOT
[facility], Emergency Department    [address]    [place] [place] [address]    Phone:  [phone]    Fax:  [phone]                                       Damon Gregory   MRN: [id number]    Department:  [facility], Emergency Department   Date of Visit:  5/18/2018           After Visit Summary Signature Page     I have received my discharge instructions, and my questions have been answered. I have discussed any challenges I see with this plan with the nurse or doctor.    ..........................................................................................................................................  Patient/Patient Representative Signature      ..........................................................................................................................................  Patient Representative Print Name and Relationship to Patient    ..................................................               ................................................  Date                                            Time    ..........................................................................................................................................  Reviewed by Signature/Title    ...................................................              ..............................................  Date                                                            Time

## 2018-05-19 VITALS
TEMPERATURE: 98.3 F | BODY MASS INDEX: 29.18 KG/M2 | OXYGEN SATURATION: 99 % | DIASTOLIC BLOOD PRESSURE: 59 MMHG | WEIGHT: 157 LBS | RESPIRATION RATE: 18 BRPM | SYSTOLIC BLOOD PRESSURE: 136 MMHG

## 2018-05-19 LAB
ANION GAP SERPL CALCULATED.3IONS-SCNC: 8 MMOL/L (ref 3–14)
BASOPHILS # BLD AUTO: 0 10E9/L (ref 0–0.2)
BASOPHILS NFR BLD AUTO: 0.2 %
BUN SERPL-MCNC: 14 MG/DL (ref 7–30)
CALCIUM SERPL-MCNC: 9.5 MG/DL (ref 8.5–10.1)
CHLORIDE SERPL-SCNC: 107 MMOL/L (ref 94–109)
CO2 SERPL-SCNC: 26 MMOL/L (ref 20–32)
CREAT SERPL-MCNC: 0.84 MG/DL (ref 0.52–1.04)
DIFFERENTIAL METHOD BLD: ABNORMAL
EOSINOPHIL # BLD AUTO: 0.3 10E9/L (ref 0–0.7)
EOSINOPHIL NFR BLD AUTO: 2.9 %
ERYTHROCYTE [DISTWIDTH] IN BLOOD BY AUTOMATED COUNT: 13 % (ref 10–15)
GFR SERPL CREATININE-BSD FRML MDRD: 68 ML/MIN/1.7M2
GLUCOSE SERPL-MCNC: 101 MG/DL (ref 70–99)
HCT VFR BLD AUTO: 35 % (ref 35–47)
HGB BLD-MCNC: 11.5 G/DL (ref 11.7–15.7)
IMM GRANULOCYTES # BLD: 0 10E9/L (ref 0–0.4)
IMM GRANULOCYTES NFR BLD: 0.2 %
LYMPHOCYTES # BLD AUTO: 2.7 10E9/L (ref 0.8–5.3)
LYMPHOCYTES NFR BLD AUTO: 31.4 %
MCH RBC QN AUTO: 28.4 PG (ref 26.5–33)
MCHC RBC AUTO-ENTMCNC: 32.9 G/DL (ref 31.5–36.5)
MCV RBC AUTO: 86 FL (ref 78–100)
MONOCYTES # BLD AUTO: 0.6 10E9/L (ref 0–1.3)
MONOCYTES NFR BLD AUTO: 6.3 %
NEUTROPHILS # BLD AUTO: 5.1 10E9/L (ref 1.6–8.3)
NEUTROPHILS NFR BLD AUTO: 59 %
NRBC # BLD AUTO: 0 10*3/UL
NRBC BLD AUTO-RTO: 0 /100
NT-PROBNP SERPL-MCNC: 113 PG/ML (ref 0–900)
PLATELET # BLD AUTO: 246 10E9/L (ref 150–450)
POTASSIUM SERPL-SCNC: 4.1 MMOL/L (ref 3.4–5.3)
RBC # BLD AUTO: 4.05 10E12/L (ref 3.8–5.2)
SODIUM SERPL-SCNC: 141 MMOL/L (ref 133–144)
TROPONIN I SERPL-MCNC: <0.015 UG/L (ref 0–0.04)
WBC # BLD AUTO: 8.7 10E9/L (ref 4–11)

## 2018-05-19 RX ORDER — LEVOFLOXACIN 750 MG/1
750 TABLET, FILM COATED ORAL DAILY
Qty: 10 TABLET | Refills: 0 | Status: SHIPPED | OUTPATIENT
Start: 2018-05-19 | End: 2018-05-29

## 2018-05-19 ASSESSMENT — ENCOUNTER SYMPTOMS
HEMATURIA: 0
SORE THROAT: 1
SHORTNESS OF BREATH: 1
BLOOD IN STOOL: 0
COUGH: 1
FREQUENCY: 0
VOMITING: 0
NAUSEA: 0
DYSURIA: 0
CONSTIPATION: 0
RHINORRHEA: 0
DIARRHEA: 0

## 2018-05-19 NOTE — ED PROVIDER NOTES
History     Chief Complaint   Patient presents with     Cough     HPI  Damon Gregory is a 63 year old female who with PMH notable for cardiomyopathy (EF 40-45% on echo in 2015 per Care Everywhere), CHF, hyperlipidemia, mitral valve insufficiency, asthma who is presenting with a 2 week history of cough, nasal congestion, now improved sore throat, having been treated with short a course of azithromycin which she finished 5 days ago, but has had ongoing symptoms prompting her evaluation here today.     Patient reports this started with a dry/nonproductive cough two weeks ago. The cough is somewhat worse when she is lying down and feels a degree of some postnasal drainage, as well as nasal congestion but not significant rhinorrhea, no epistaxis. She had a sore throat when this first started two weeks ago, but that has been improving. She is able to tolerate her own secretions/swallow without issue. She is somewhat SOB but worse with the coughing. She has not had any hemoptysis. No leg pain or swelling. She reports that prior to her diagnosis with cardiomyopathy/CHF she was treated for asthma for a number of weeks to months and then was eventually found to have some of the cardiac findings as above. She is managed with daily baby aspirin, Lasix, losartan. Denies any nausea or vomiting, no change in bowel or bladder habits, no rashes or skin changes, no known ill context. No other new symptoms or complaints at this time. Please see ROS for further details.     I have reviewed the Medications, Allergies, Past Medical and Surgical History, and Social History in the Epic system.    Review of Systems   HENT: Positive for congestion, postnasal drip and sore throat (Currently resolved). Negative for rhinorrhea (Not significant).    Respiratory: Positive for cough (Dry/nonproductive) and shortness of breath.    Cardiovascular: Negative for leg swelling.   Gastrointestinal: Negative for blood in stool, constipation, diarrhea,  nausea and vomiting.   Genitourinary: Negative for dysuria, frequency, hematuria and urgency.   Skin: Negative for rash.       Physical Exam   BP: 120/45  Heart Rate: 74  Temp: 98.1  F (36.7  C)  Resp: 20  Weight: 71.2 kg (157 lb)  SpO2: 98 %      Physical Exam  CONSTITUTIONAL: Well-developed and well-nourished. Awake and alert. Non-toxic appearance. No acute distress.   HENT:   - Head: Normocephalic and atraumatic.   - Ears: Hearing and external ear grossly normal.   - Nose: Nose normal. No rhinorrhea. No epistaxis.   - Mouth/Throat: MMM  EYES: Conjunctivae and lids are normal. No scleral icterus.   NECK: Normal range of motion and phonation normal. Neck supple.  No tracheal deviation, no stridor. No edema or erythema noted.  CARDIOVASCULAR: Normal rate, regular rhythm and no appreciable abnormal heart sounds.  PULMONARY/CHEST: Normal work of breathing. No accessory muscle usage or stridor. No respiratory distress.  No appreciable abnormal breath sounds. Does have occasional dry/nonproductive cough in the ED.  ABDOMEN: Soft, non-distended. No tenderness. No rigidity, rebound or guarding.   MUSCULOSKELETAL: Extremities warm and seemingly well perfused. No edema or calf tenderness.  NEUROLOGIC: Awake, alert. Not disoriented.  Normal tone. No seizure activity. GCS 15  SKIN: Skin is warm and dry. No rash noted. No diaphoresis. No pallor.   PSYCHIATRIC: Normal mood and affect. Speech and behavior normal. Thought processes linear. Cognition and memory are normal.     ED Course     ED Course     Procedures             Labs Ordered and Resulted from Time of ED Arrival Up to the Time of Departure from the ED   CBC WITH PLATELETS DIFFERENTIAL - Abnormal; Notable for the following:        Result Value    Hemoglobin 11.5 (*)     All other components within normal limits   BASIC METABOLIC PANEL - Abnormal; Notable for the following:     Glucose 101 (*)     All other components within normal limits   ISTAT  GASES LACTATE AVELINO  "POCT - Abnormal; Notable for the following:     PCO2 Venous 38 (*)     All other components within normal limits   HCG QUAL URINE POCT - Normal   NT PROBNP INPATIENT   TROPONIN I   ISTAT CG4 GASES LACTATE AVELINO NURSING POCT            Assessments & Plan (with Medical Decision Making)   IMPRESSION: 63 year old female w/ PMH notable for cardiomyopathy and CHF (echo in 2015 showed an EF of 40-45%), managed with Lasix, losartan, etc., presenting with 2 week history of cough, somewhat worse with lying down as further described above in HPI/ROS.  Clinically, patient appears non-toxic, NAD. Vitals WNL. Otherwise on examination, she has normal breathing at rest and can speak in full sentences without any type of distress, may have some crackles, particularly on the right base, no significant wheezing or prolonged expiratory phase. No other acute findings on exam. To include notable findings for significant fluid overload, DVT, etc.    Ddx includes, but not limited to, infectious respiratory illness (bacterial, viral, etc.), recommended that we check her for influenza and pertussis given her ongoing cough, though patient refuses nasal swab adamantly (did discuss risk but patient still adamantly declines), less likely heart failure, etc. While relatively uncommon, could consider cough related to her losartan use.    PLAN: Laboratory studies, CXR, influenza and pertussis testing (patient refused), symptom management and clinical observation.    RESULTS:  See ED Course section above for particular pertinent findings and comments  - Labs: Normal VBG, normal lactate, no leukocytosis, HGB up from previous, no acute findings on BMP. Negative pregnancy. BNP and troponin normal/negative  - Imaging: I have personally reviewed and interpreted the radiologic images, and have reviewed and agree with written preliminary reports:  --- CXR:  \"1. A small amount of hazy opacities in the medial aspects of bilateral lung bases that could " "represent atelectasis or pneumonia.  2. No other findings suspicious for active cardiopulmonary disease.\"    RE-EVALUATION:  - Pt continues to do well here in the ED, no acute issues or apparent concerning changes in vitals or clinical appearance.     DISCUSSIONS:  - w/ Patient: I have reviewed the available findings, plan, need for follow up, and strict return instructions with the patient and her daughter. They expressed understanding and agreement with this plan. All questions answered to the best of our ability at this time.     DISPOSITION/PLANNING:  - IMPRESSION: Cough  - DISPOSITION: D/C to home  --- Follow-up: w/ PCP in 2-3 days it not improving and return to the ED w/ any new/worsening symptoms  - RECOMMENDATIONS: Conservative symptom management, strict return instructions  --- Rx: Levofloxacin      ______________________________________________________________________________    - I have reviewed the available nursing notes.        Discharge Medication List as of 5/19/2018  1:03 AM      START taking these medications    Details   levofloxacin (LEVAQUIN) 750 MG tablet Take 1 tablet (750 mg) by mouth daily for 10 days, Disp-10 tablet, R-0, Local Print             Final diagnoses:   Cough       5/18/2018   [facility], EMERGENCY DEPARTMENT     Sybil King MD  05/22/18 0029    "

## 2018-05-19 NOTE — DISCHARGE INSTRUCTIONS
FOLLOW-UP:  Please make an appointment to follow up with:  - Your Primary Care Provider in 2-3 days if not improving.   --- If you are still having symptoms, they may need to try other medications or perform other tests, such as for influenza, whooping cough, or other breathing tests or imaging.   - Return to the Emergency Department with any new or worsening symptoms.     PRESCRIPTIONS / MEDICATIONS:  - Take the prescribed antibiotics until completed or told otherwise by a medical provider.  - Minimize strenuous physical activity while on this antibiotic, and notify a medical provider if you develop any joint/tendon pain, develop any palpitations, lightheadedness, etc.    RETURN TO THE EMERGENCY DEPARTMENT  Return to the Emergency Department at any time for new/worsening symptoms.       Pneumonia (Adult)  Pneumonia is an infection deep within the lungs. It is in the small air sacs (alveoli). Pneumonia may be caused by a virus or bacteria. Pneumonia caused by bacteria is usually treated with an antibiotic. Severe cases may need to be treated in the hospital. Milder cases can be treated at home. Symptoms usually start to get better during the first 2 days of treatment.    Home care  Follow these guidelines when caring for yourself at home:    Rest at home for the first 2 to 3 days, or until you feel stronger. Don t let yourself get overly tired when you go back to your activities.    Stay away from cigarette smoke - yours or other people s.    You may use acetaminophen or ibuprofen to control fever or pain, unless another medicine was prescribed. If you have chronic liver or kidney disease, talk with your healthcare provider before using these medicines. Also talk with your provider if you ve had a stomach ulcer or gastrointestinal bleeding. Don t give aspirin to anyone younger than 18 years of age who is ill with a fever. It may cause severe liver damage.    Your appetite may be poor, so a light diet is  fine.    Drink 6 to 8 glasses of fluids every day to make sure you are getting enough fluids. Beverages can include water, sport drinks, sodas without caffeine, juices, tea, or soup. Fluids will help loosen secretions in the lung. This will make it easier for you to cough up the phlegm (sputum). If you also have heart or kidney disease, check with your healthcare provider before you drink extra fluids.    Take antibiotic medicine prescribed until it is all gone, even if you are feeling better after a few days.  Follow-up care  Follow up with your healthcare provider in the next 2 to 3 days, or as advised. This is to be sure the medicine is helping you get better.  If you are 65 or older, you should get a pneumococcal vaccine and a yearly flu (influenza) shot. You should also get these vaccines if you have chronic lung disease like asthma, emphysema, or COPD. Recently, a second type of pneumonia vaccine has become available for everyone over 65 years old. This is in addition to the previous vaccine. Ask your provider about this.  When to seek medical advice  Call your healthcare provider right away if any of these occur:    You don t get better within the first 48 hours of treatment    Shortness of breath gets worse    Rapid breathing (more than 25 breaths per minute)    Coughing up blood    Chest pain gets worse with breathing    Fever of 100.4 F (38 C) or higher that doesn t get better with fever medicine    Weakness, dizziness, or fainting that gets worse    Thirst or dry mouth that gets worse    Sinus pain, headache, or a stiff neck    Chest pain not caused by coughing  Date Last Reviewed: 1/1/2017 2000-2017 [employer]. [address] [place] [address], [place] [address]. All rights reserved. This information is not intended as a substitute for professional medical care. Always follow your healthcare professional's instructions.

## 2018-05-19 NOTE — ED TRIAGE NOTES
Finished antibiotics five days ago for URI.  Still no better.  Coughing won't stop.  Cough worse when lying down.

## 2018-11-24 DIAGNOSIS — I50.9 CHRONIC CONGESTIVE HEART FAILURE (H): ICD-10-CM

## 2018-11-24 NOTE — TELEPHONE ENCOUNTER
"Requested Prescriptions   Pending Prescriptions Disp Refills     losartan (COZAAR) 50 MG tablet [Pharmacy Med Name: LOSARTAN POTASSIUM 50 MG TAB]  Last Written Prescription Date:  10/18/2017  Last Fill Quantity: 90 tabs,  # refills: 3   Last office visit: 5/4/2018 with prescribing provider:  Ariel   Future Office Visit:     90 tablet 1     Sig: TAKE 1 TABLET (50 MG) BY MOUTH DAILY    Angiotensin-II Receptors Passed    11/24/2018  1:46 AM       Passed - Blood pressure under 140/90 in past 12 months    BP Readings from Last 3 Encounters:   05/19/18 136/59   05/04/18 116/67   12/11/17 131/85                Passed - Recent (12 mo) or future (30 days) visit within the authorizing provider's specialty    Patient had office visit in the last 12 months or has a visit in the next 30 days with authorizing provider or within the authorizing provider's specialty.  See \"Patient Info\" tab in inbasket, or \"Choose Columns\" in Meds & Orders section of the refill encounter.             Passed - Patient is age 18 or older       Passed - No active pregnancy on record       Passed - Normal serum creatinine on file in past 12 months    Recent Labs   Lab Test  05/18/18   2354   CR  0.84            Passed - Normal serum potassium on file in past 12 months    Recent Labs   Lab Test  05/18/18   2354   POTASSIUM  4.1                   Passed - No positive pregnancy test in past 12 months          "

## 2018-11-27 RX ORDER — LOSARTAN POTASSIUM 50 MG/1
TABLET ORAL
Qty: 90 TABLET | Refills: 1 | Status: SHIPPED | OUTPATIENT
Start: 2018-11-27 | End: 2019-05-22

## 2018-12-31 DIAGNOSIS — I50.22 CHRONIC SYSTOLIC CONGESTIVE HEART FAILURE (H): ICD-10-CM

## 2018-12-31 RX ORDER — METOPROLOL SUCCINATE 50 MG/1
50 TABLET, EXTENDED RELEASE ORAL AT BEDTIME
Qty: 90 TABLET | Refills: 1 | Status: SHIPPED | OUTPATIENT
Start: 2018-12-31 | End: 2019-05-22

## 2018-12-31 NOTE — TELEPHONE ENCOUNTER
"Requested Prescriptions   Pending Prescriptions Disp Refills     metoprolol succinate ER (TOPROL-XL) 50 MG 24 hr tablet [Pharmacy Med Name: METOPROLOL SUCC ER 50 MG TAB]  Last Written Prescription Date:  10-18-17  Last Fill Quantity: 90 tab,  # refills: 3   Last office visit: 5/4/2018 with prescribing provider:  SHERRILL GARCIA    Future Office Visit:    90 tablet 1     Sig: TAKE 1 TABLET (50 MG) BY MOUTH AT BEDTIME    Beta-Blockers Protocol Passed - 12/31/2018  9:09 AM       Passed - Blood pressure under 140/90 in past 12 months    BP Readings from Last 3 Encounters:   05/19/18 136/59   05/04/18 116/67   12/11/17 131/85          Passed - Patient is age 6 or older       Passed - Recent (12 mo) or future (30 days) visit within the authorizing provider's specialty    Patient had office visit in the last 12 months or has a visit in the next 30 days with authorizing provider or within the authorizing provider's specialty.  See \"Patient Info\" tab in inbasket, or \"Choose Columns\" in Meds & Orders section of the refill encounter.             "

## 2019-01-01 NOTE — TELEPHONE ENCOUNTER
Prescription approved per [facility] Refill Protocol.    Signed Prescriptions:                        Disp   Refills    metoprolol succinate ER (TOPROL-XL) 50 MG *90 tab*1        Sig: TAKE 1 TABLET (50 MG) BY MOUTH AT BEDTIME  Authorizing Provider: CHRIS ARROYO  Ordering User: IMANI GASPAR      Closing encounter - no further actions needed at this time    Imani Gaspar RN

## 2019-02-11 ENCOUNTER — OFFICE VISIT (OUTPATIENT)
Dept: URGENT CARE | Facility: URGENT CARE | Age: 64
End: 2019-02-11
Payer: COMMERCIAL

## 2019-02-11 ENCOUNTER — ANCILLARY PROCEDURE (OUTPATIENT)
Dept: GENERAL RADIOLOGY | Facility: CLINIC | Age: 64
End: 2019-02-11
Attending: INTERNAL MEDICINE
Payer: COMMERCIAL

## 2019-02-11 VITALS
SYSTOLIC BLOOD PRESSURE: 133 MMHG | DIASTOLIC BLOOD PRESSURE: 70 MMHG | BODY MASS INDEX: 29.74 KG/M2 | HEART RATE: 98 BPM | OXYGEN SATURATION: 97 % | TEMPERATURE: 103.5 F | WEIGHT: 160 LBS

## 2019-02-11 DIAGNOSIS — R50.9 ACUTE FEBRILE ILLNESS: ICD-10-CM

## 2019-02-11 DIAGNOSIS — R05.9 COUGH: ICD-10-CM

## 2019-02-11 DIAGNOSIS — J18.9 PNEUMONIA OF RIGHT LOWER LOBE DUE TO INFECTIOUS ORGANISM: Primary | ICD-10-CM

## 2019-02-11 LAB
BASOPHILS # BLD AUTO: 0 10E9/L (ref 0–0.2)
BASOPHILS NFR BLD AUTO: 0.3 %
DIFFERENTIAL METHOD BLD: ABNORMAL
EOSINOPHIL # BLD AUTO: 0 10E9/L (ref 0–0.7)
EOSINOPHIL NFR BLD AUTO: 0.5 %
ERYTHROCYTE [DISTWIDTH] IN BLOOD BY AUTOMATED COUNT: 13 % (ref 10–15)
FLUAV+FLUBV AG SPEC QL: NEGATIVE
FLUAV+FLUBV AG SPEC QL: NEGATIVE
HCT VFR BLD AUTO: 37 % (ref 35–47)
HGB BLD-MCNC: 12 G/DL (ref 11.7–15.7)
LYMPHOCYTES # BLD AUTO: 0.6 10E9/L (ref 0.8–5.3)
LYMPHOCYTES NFR BLD AUTO: 9 %
MCH RBC QN AUTO: 28.9 PG (ref 26.5–33)
MCHC RBC AUTO-ENTMCNC: 32.4 G/DL (ref 31.5–36.5)
MCV RBC AUTO: 89 FL (ref 78–100)
MONOCYTES # BLD AUTO: 0.6 10E9/L (ref 0–1.3)
MONOCYTES NFR BLD AUTO: 9.5 %
NEUTROPHILS # BLD AUTO: 4.9 10E9/L (ref 1.6–8.3)
NEUTROPHILS NFR BLD AUTO: 80.7 %
PLATELET # BLD AUTO: 197 10E9/L (ref 150–450)
RBC # BLD AUTO: 4.15 10E12/L (ref 3.8–5.2)
SPECIMEN SOURCE: NORMAL
WBC # BLD AUTO: 6.1 10E9/L (ref 4–11)

## 2019-02-11 PROCEDURE — 87804 INFLUENZA ASSAY W/OPTIC: CPT | Performed by: INTERNAL MEDICINE

## 2019-02-11 PROCEDURE — 99214 OFFICE O/P EST MOD 30 MIN: CPT | Performed by: INTERNAL MEDICINE

## 2019-02-11 PROCEDURE — 36415 COLL VENOUS BLD VENIPUNCTURE: CPT | Performed by: INTERNAL MEDICINE

## 2019-02-11 PROCEDURE — 85025 COMPLETE CBC W/AUTO DIFF WBC: CPT | Performed by: INTERNAL MEDICINE

## 2019-02-11 PROCEDURE — 71046 X-RAY EXAM CHEST 2 VIEWS: CPT

## 2019-02-11 RX ORDER — CEFDINIR 300 MG/1
300 CAPSULE ORAL 2 TIMES DAILY
Qty: 20 CAPSULE | Refills: 0 | Status: SHIPPED | OUTPATIENT
Start: 2019-02-11 | End: 2019-05-22

## 2019-02-11 ASSESSMENT — ENCOUNTER SYMPTOMS
COUGH: 1
WHEEZING: 0
SORE THROAT: 0
CONSTIPATION: 1
VOMITING: 1
NAUSEA: 1
RHINORRHEA: 0

## 2019-02-12 NOTE — PROGRESS NOTES
SUBJECTIVE:   Damon Gregory is a 64 year old female presenting with a chief complaint of   Chief Complaint   Patient presents with     Urgent Care     Pt in clinic to have eval for fever, cough, and vomiting for 3 days.     Fever     Cough     Vomiting       She is an established patient of [facility].    URI Adult    Onset of symptoms was 3 day(s) ago.    Current and Associated symptoms: fever, cough - productive, nausea and vomiting  Treatment measures tried include Tylenol/Ibuprofen and dima seltzer cough.  Predisposing factors include ill contact: Family member .  - grandchildren sick      Review of Systems   HENT: Negative for congestion, rhinorrhea, sneezing and sore throat.    Respiratory: Positive for cough. Negative for wheezing.    Gastrointestinal: Positive for constipation, nausea and vomiting.       Past Medical History:   Diagnosis Date     Asthma      Cardiomyopathy, unspecified (H) / EF40% 10/5/2015     CHF (congestive heart failure) (H)      Hyperlipidemia      Kidney stone 10/2013     Lipoma 2000     Menopause      Mitral valve insufficiency 10/5/2015     Vitamin D deficiency      No family history on file.  Current Outpatient Medications   Medication Sig Dispense Refill     aspirin 81 MG tablet Take 1 tablet (81 mg) by mouth daily 100 tablet 3     cefdinir (OMNICEF) 300 MG capsule Take 1 capsule (300 mg) by mouth 2 times daily for 10 days 20 capsule 0     Cyanocobalamin (VITAMIN B 12 PO) Take 1,000 mcg by mouth daily       furosemide (LASIX) 20 MG tablet Take 1 tablet (20 mg) by mouth daily Take 1 tab daily 90 tablet 3     losartan (COZAAR) 50 MG tablet TAKE 1 TABLET (50 MG) BY MOUTH DAILY 90 tablet 1     metoprolol succinate ER (TOPROL-XL) 50 MG 24 hr tablet TAKE 1 TABLET (50 MG) BY MOUTH AT BEDTIME 90 tablet 1     UNABLE TO FIND daily MEDICATION NAME: Vitamin D with something for joints       Social History     Tobacco Use     Smoking status: Never Smoker     Smokeless tobacco: Never Used    Substance Use Topics     Alcohol use: No       OBJECTIVE  /70   Pulse 98   Temp 103.5  F (39.7  C) (Tympanic)   Wt 72.6 kg (160 lb)   LMP  (LMP Unknown)   SpO2 97%   BMI 29.74 kg/m      Physical Exam   Constitutional: She appears well-developed and well-nourished.   HENT:   Tm nl B  OP - unable to visualize even with tongue depressor as gag tongue reflex   Eyes: Conjunctivae are normal.   Cardiovascular: Normal rate, regular rhythm and normal heart sounds.   Pulmonary/Chest: Effort normal and breath sounds normal. No respiratory distress. She has no wheezes. She has no rales.   Lymphadenopathy:     She has no cervical adenopathy.   Skin: No rash noted.   Vitals reviewed.      Labs:wbc 6.1, hgb 12, plt 197 per lab screen  Results for orders placed or performed in visit on 02/11/19 (from the past 24 hour(s))   Influenza A/B antigen   Result Value Ref Range    Influenza A/B Agn Specimen Nasopharyngeal     Influenza A Negative NEG^Negative    Influenza B Negative NEG^Negative   CBC with platelets and differential   Result Value Ref Range    WBC 6.1 4.0 - 11.0 10e9/L    RBC Count 4.15 3.8 - 5.2 10e12/L    Hemoglobin 12.0 11.7 - 15.7 g/dL    Hematocrit 37.0 35.0 - 47.0 %    MCV 89 78 - 100 fl    MCH 28.9 26.5 - 33.0 pg    MCHC 32.4 31.5 - 36.5 g/dL    RDW 13.0 10.0 - 15.0 %    Platelet Count 197 150 - 450 10e9/L    % Neutrophils 80.7 %    % Lymphocytes 9.0 %    % Monocytes 9.5 %    % Eosinophils 0.5 %    % Basophils 0.3 %    Absolute Neutrophil 4.9 1.6 - 8.3 10e9/L    Absolute Lymphocytes 0.6 (L) 0.8 - 5.3 10e9/L    Absolute Monocytes 0.6 0.0 - 1.3 10e9/L    Absolute Eosinophils 0.0 0.0 - 0.7 10e9/L    Absolute Basophils 0.0 0.0 - 0.2 10e9/L    Diff Method Automated Method        X-Ray was done, my findings are: right lower lobe density near cardiac silhouette.      ASSESSMENT:      ICD-10-CM    1. Pneumonia of right lower lobe due to infectious organism (H) J18.1 cefdinir (OMNICEF) 300 MG capsule   2. Acute  febrile illness R50.9 CBC with platelets and differential     XR Chest 2 Views   3. Cough R05 CBC with platelets and differential     XR Chest 2 Views            Patient Instructions   chest x-ray shows right lower lobe pneumonia  White count is good  Will treat with oral antibiotics 2 x day for 10 days  Eat yogurt daily while on antibiotics     Recheck 2 days if fever persists  otherwise recheck in 1 week.        Patient Education     Pneumonia (Adult)  Pneumonia is an infection deep within the lungs. It is in the small air sacs (alveoli). Pneumonia may be caused by a virus or bacteria. Pneumonia caused by bacteria is usually treated with an antibiotic. Severe cases may need to be treated in the hospital. Milder cases can be treated at home. Symptoms usually start to get better during the first 2 days of treatment.    Home care  Follow these guidelines when caring for yourself at home:    Rest at home for the first 2 to 3 days, or until you feel stronger. Don t let yourself get overly tired when you go back to your activities.    Stay away from cigarette smoke - yours or other people s.    You may use acetaminophen or ibuprofen to control fever or pain, unless another medicine was prescribed. If you have chronic liver or kidney disease, talk with your healthcare provider before using these medicines. Also talk with your provider if you ve had a stomach ulcer or gastrointestinal bleeding. Don t give aspirin to anyone younger than 18 years of age who is ill with a fever. It may cause severe liver damage.    Your appetite may be poor, so a light diet is fine.    Drink 6 to 8 glasses of fluids every day to make sure you are getting enough fluids. Beverages can include water, sport drinks, sodas without caffeine, juices, tea, or soup. Fluids will help loosen secretions in the lung. This will make it easier for you to cough up the phlegm (sputum). If you also have heart or kidney disease, check with your healthcare  provider before you drink extra fluids.    Take antibiotic medicine prescribed until it is all gone, even if you are feeling better after a few days.  Follow-up care  Follow up with your healthcare provider in the next 2 to 3 days, or as advised. This is to be sure the medicine is helping you get better.  If you are 65 or older, you should get a pneumococcal vaccine and a yearly flu (influenza) shot. You should also get these vaccines if you have chronic lung disease like asthma, emphysema, or COPD. Recently, a second type of pneumonia vaccine has become available for everyone over 65 years old. This is in addition to the previous vaccine. Ask your provider about this.  When to seek medical advice  Call your healthcare provider right away if any of these occur:    You don t get better within the first 48 hours of treatment    Shortness of breath gets worse    Rapid breathing (more than 25 breaths per minute)    Coughing up blood    Chest pain gets worse with breathing    Fever of 100.4 F (38 C) or higher that doesn t get better with fever medicine    Weakness, dizziness, or fainting that gets worse    Thirst or dry mouth that gets worse    Sinus pain, headache, or a stiff neck    Chest pain not caused by coughing  Date Last Reviewed: 1/1/2017 2000-2018 [employer]. [address] [place] [address], [place] [address]. All rights reserved. This information is not intended as a substitute for professional medical care. Always follow your healthcare professional's instructions.

## 2019-02-12 NOTE — PATIENT INSTRUCTIONS
chest x-ray shows right lower lobe pneumonia  White count is good  Will treat with oral antibiotics 2 x day for 10 days  Eat yogurt daily while on antibiotics     Recheck 2 days if fever persists  otherwise recheck in 1 week.        Patient Education     Pneumonia (Adult)  Pneumonia is an infection deep within the lungs. It is in the small air sacs (alveoli). Pneumonia may be caused by a virus or bacteria. Pneumonia caused by bacteria is usually treated with an antibiotic. Severe cases may need to be treated in the hospital. Milder cases can be treated at home. Symptoms usually start to get better during the first 2 days of treatment.    Home care  Follow these guidelines when caring for yourself at home:    Rest at home for the first 2 to 3 days, or until you feel stronger. Don t let yourself get overly tired when you go back to your activities.    Stay away from cigarette smoke - yours or other people s.    You may use acetaminophen or ibuprofen to control fever or pain, unless another medicine was prescribed. If you have chronic liver or kidney disease, talk with your healthcare provider before using these medicines. Also talk with your provider if you ve had a stomach ulcer or gastrointestinal bleeding. Don t give aspirin to anyone younger than 18 years of age who is ill with a fever. It may cause severe liver damage.    Your appetite may be poor, so a light diet is fine.    Drink 6 to 8 glasses of fluids every day to make sure you are getting enough fluids. Beverages can include water, sport drinks, sodas without caffeine, juices, tea, or soup. Fluids will help loosen secretions in the lung. This will make it easier for you to cough up the phlegm (sputum). If you also have heart or kidney disease, check with your healthcare provider before you drink extra fluids.    Take antibiotic medicine prescribed until it is all gone, even if you are feeling better after a few days.  Follow-up care  Follow up with your  healthcare provider in the next 2 to 3 days, or as advised. This is to be sure the medicine is helping you get better.  If you are 65 or older, you should get a pneumococcal vaccine and a yearly flu (influenza) shot. You should also get these vaccines if you have chronic lung disease like asthma, emphysema, or COPD. Recently, a second type of pneumonia vaccine has become available for everyone over 65 years old. This is in addition to the previous vaccine. Ask your provider about this.  When to seek medical advice  Call your healthcare provider right away if any of these occur:    You don t get better within the first 48 hours of treatment    Shortness of breath gets worse    Rapid breathing (more than 25 breaths per minute)    Coughing up blood    Chest pain gets worse with breathing    Fever of 100.4 F (38 C) or higher that doesn t get better with fever medicine    Weakness, dizziness, or fainting that gets worse    Thirst or dry mouth that gets worse    Sinus pain, headache, or a stiff neck    Chest pain not caused by coughing  Date Last Reviewed: 1/1/2017 2000-2018 [employer]. [address] [place] [address], [place] [address]. All rights reserved. This information is not intended as a substitute for professional medical care. Always follow your healthcare professional's instructions.

## 2019-04-16 DIAGNOSIS — I50.9 CHRONIC CONGESTIVE HEART FAILURE (H): ICD-10-CM

## 2019-04-18 RX ORDER — FUROSEMIDE 20 MG
TABLET ORAL
Qty: 90 TABLET | Refills: 0 | Status: SHIPPED | OUTPATIENT
Start: 2019-04-18 | End: 2019-05-22

## 2019-04-18 NOTE — TELEPHONE ENCOUNTER
Prescription approved per [facility] Refill Protocol.  Jen Pritchett RN

## 2019-04-30 ENCOUNTER — DOCUMENTATION ONLY (OUTPATIENT)
Dept: CARE COORDINATION | Facility: CLINIC | Age: 64
End: 2019-04-30

## 2019-05-22 ENCOUNTER — OFFICE VISIT (OUTPATIENT)
Dept: FAMILY MEDICINE | Facility: CLINIC | Age: 64
End: 2019-05-22
Payer: COMMERCIAL

## 2019-05-22 VITALS
SYSTOLIC BLOOD PRESSURE: 139 MMHG | DIASTOLIC BLOOD PRESSURE: 68 MMHG | WEIGHT: 155 LBS | TEMPERATURE: 97.5 F | BODY MASS INDEX: 28.81 KG/M2 | OXYGEN SATURATION: 98 % | HEART RATE: 51 BPM | RESPIRATION RATE: 16 BRPM

## 2019-05-22 DIAGNOSIS — M79.661 RIGHT CALF PAIN: Primary | ICD-10-CM

## 2019-05-22 DIAGNOSIS — I42.9 CARDIOMYOPATHY, UNSPECIFIED TYPE (H): ICD-10-CM

## 2019-05-22 DIAGNOSIS — E55.9 VITAMIN D DEFICIENCY: ICD-10-CM

## 2019-05-22 DIAGNOSIS — I50.22 CHRONIC SYSTOLIC HEART FAILURE (H): ICD-10-CM

## 2019-05-22 DIAGNOSIS — Z51.81 ENCOUNTER FOR THERAPEUTIC DRUG MONITORING: ICD-10-CM

## 2019-05-22 LAB
ANION GAP SERPL CALCULATED.3IONS-SCNC: 7 MMOL/L (ref 3–14)
BUN SERPL-MCNC: 16 MG/DL (ref 7–30)
CALCIUM SERPL-MCNC: 9.7 MG/DL (ref 8.5–10.1)
CHLORIDE SERPL-SCNC: 108 MMOL/L (ref 94–109)
CHOLEST SERPL-MCNC: 224 MG/DL
CO2 SERPL-SCNC: 25 MMOL/L (ref 20–32)
CREAT SERPL-MCNC: 0.8 MG/DL (ref 0.52–1.04)
D DIMER PPP FEU-MCNC: <0.3 UG/ML FEU (ref 0–0.5)
DEPRECATED CALCIDIOL+CALCIFEROL SERPL-MC: 19 UG/L (ref 20–75)
GFR SERPL CREATININE-BSD FRML MDRD: 78 ML/MIN/{1.73_M2}
GLUCOSE SERPL-MCNC: 115 MG/DL (ref 70–99)
HDLC SERPL-MCNC: 46 MG/DL
LDLC SERPL CALC-MCNC: 139 MG/DL
NONHDLC SERPL-MCNC: 178 MG/DL
POTASSIUM SERPL-SCNC: 4.5 MMOL/L (ref 3.4–5.3)
SODIUM SERPL-SCNC: 140 MMOL/L (ref 133–144)
TRIGL SERPL-MCNC: 196 MG/DL

## 2019-05-22 PROCEDURE — 80048 BASIC METABOLIC PNL TOTAL CA: CPT | Performed by: FAMILY MEDICINE

## 2019-05-22 PROCEDURE — 85379 FIBRIN DEGRADATION QUANT: CPT | Performed by: FAMILY MEDICINE

## 2019-05-22 PROCEDURE — 36415 COLL VENOUS BLD VENIPUNCTURE: CPT | Performed by: FAMILY MEDICINE

## 2019-05-22 PROCEDURE — 82306 VITAMIN D 25 HYDROXY: CPT | Performed by: FAMILY MEDICINE

## 2019-05-22 PROCEDURE — 99214 OFFICE O/P EST MOD 30 MIN: CPT | Performed by: FAMILY MEDICINE

## 2019-05-22 PROCEDURE — 80061 LIPID PANEL: CPT | Performed by: FAMILY MEDICINE

## 2019-05-22 RX ORDER — METOPROLOL SUCCINATE 50 MG/1
50 TABLET, EXTENDED RELEASE ORAL AT BEDTIME
Qty: 90 TABLET | Refills: 1 | Status: SHIPPED | OUTPATIENT
Start: 2019-05-22 | End: 2020-06-09

## 2019-05-22 RX ORDER — LOSARTAN POTASSIUM 50 MG/1
50 TABLET ORAL DAILY
Qty: 90 TABLET | Refills: 1 | Status: SHIPPED | OUTPATIENT
Start: 2019-05-22 | End: 2020-02-13

## 2019-05-22 RX ORDER — FUROSEMIDE 20 MG
20 TABLET ORAL DAILY
Qty: 90 TABLET | Refills: 1 | Status: SHIPPED | OUTPATIENT
Start: 2019-05-22 | End: 2020-06-11

## 2019-05-22 NOTE — PROGRESS NOTES
Subjective     Damon Gregory is a 64 year old female who presents to clinic today for the following health issues:    HPI   Musculoskeletal problem/pain      Duration: x1 week     Description  Location: right calve     Intensity:  5/10    Accompanying signs and symptoms: pulled muscle sensation     History  Previous similar problem: no   Previous evaluation:  none    Precipitating or alleviating factors:  Trauma or overuse: no   Aggravating factors include: transitioning from sitting to standing     Therapies tried and outcome: massage with vape and oil     Additional: pt's daughter reports that pt needs lab testing done for medication. Doesn't know which one but does say pt needs kidney function test done     1.  Right calf pain: this started one week ago; no swelling or erythema; pain improves with massage and stretching.  Getting up from sitting down and other movements are more painful.  She has no h/o clot; her family is concerned for potential blood clot.  No recent surgery, illness, or immobility.  No trauma.     2.  HFrEF: the patient has not been back to see her cardiologist, so she was advised she needed to have lab monitoring before they would refill her medications.  She denies any edema, SOB or chest pain.  She does have an appt with her cardiologist, but not until this summer.  They request that I run her labs and refill her medications.      They also ask that I check on her vitamin D level.     She is here with her daughter who interprets.   Patient Active Problem List   Diagnosis     Chronic congestive heart failure, unspecified congestive heart failure type     Multiple joint pain     Hyperlipidemia     Cardiomyopathy, unspecified (H) / EF40%     Mitral valve insufficiency/ mod to severe     History reviewed. No pertinent surgical history.    Social History     Tobacco Use     Smoking status: Never Smoker     Smokeless tobacco: Never Used   Substance Use Topics     Alcohol use: No     History  reviewed. No pertinent family history.      Current Outpatient Medications   Medication Sig Dispense Refill     aspirin 81 MG tablet Take 1 tablet (81 mg) by mouth daily 100 tablet 3     Cyanocobalamin (VITAMIN B 12 PO) Take 1,000 mcg by mouth daily       furosemide (LASIX) 20 MG tablet Take 1 tablet (20 mg) by mouth daily 90 tablet 1     losartan (COZAAR) 50 MG tablet Take 1 tablet (50 mg) by mouth daily 90 tablet 1     metoprolol succinate ER (TOPROL-XL) 50 MG 24 hr tablet Take 1 tablet (50 mg) by mouth At Bedtime 90 tablet 1     UNABLE TO FIND daily MEDICATION NAME: Vitamin D with something for joints       BP Readings from Last 3 Encounters:   05/22/19 139/68   02/11/19 133/70   05/19/18 136/59    Wt Readings from Last 3 Encounters:   05/22/19 70.3 kg (155 lb)   02/11/19 72.6 kg (160 lb)   05/18/18 71.2 kg (157 lb)                      Reviewed and updated as needed this visit by Provider         Review of Systems   ROS COMP: Constitutional, HEENT, cardiovascular, pulmonary, gi and gu systems are negative, except as otherwise noted.      Objective    LMP  (LMP Unknown)   There is no height or weight on file to calculate BMI.  Physical Exam   GENERAL APPEARANCE: healthy, alert and no distress  EYES: Eyes grossly normal to inspection, PERRL and conjunctivae and sclerae normal  RESP: lungs clear to auscultation - no rales, rhonchi or wheezes  CV: regular rates and rhythm, normal S1 S2, no S3 or S4 and no murmur, click or rub  MS: extremities normal- no gross deformities noted.  Calves are symmetric.  Mild ttp over the right distal hamstring and popliteal fossa, however no obvious swelling there.  No overlying erythema.      Diagnostic Test Results:  Labs reviewed in Epic  Results for orders placed or performed in visit on 05/22/19 (from the past 24 hour(s))   D dimer, quantitative   Result Value Ref Range    D Dimer <0.3 0.0 - 0.50 ug/ml FEU   Basic metabolic panel   Result Value Ref Range    Sodium 140 133 - 144  mmol/L    Potassium 4.5 3.4 - 5.3 mmol/L    Chloride 108 94 - 109 mmol/L    Carbon Dioxide 25 20 - 32 mmol/L    Anion Gap 7 3 - 14 mmol/L    Glucose 115 (H) 70 - 99 mg/dL    Urea Nitrogen 16 7 - 30 mg/dL    Creatinine 0.80 0.52 - 1.04 mg/dL    GFR Estimate 78 >60 mL/min/[1.73_m2]    GFR Estimate If Black >90 >60 mL/min/[1.73_m2]    Calcium 9.7 8.5 - 10.1 mg/dL   Lipid Profile   Result Value Ref Range    Cholesterol 224 (H) <200 mg/dL    Triglycerides 196 (H) <150 mg/dL    HDL Cholesterol 46 (L) >49 mg/dL    LDL Cholesterol Calculated 139 (H) <100 mg/dL    Non HDL Cholesterol 178 (H) <130 mg/dL           Assessment & Plan     1. Right calf pain  D dimer is negative.  Advised that this is likely calf/hamstring strain; other possibility to include Baker's cyst, though I did not appreciate any asymmetry in the popliteal fossae.  As it is getting better on its own, I advised applying heat and gently stretching as tolerated.  Advised they may let me know if she would like to have a physical therapy referral.    - D dimer, quantitative    2. Encounter for therapeutic drug monitoring    - Basic metabolic panel    3. Cardiomyopathy, unspecified type (H)    - Lipid Profile    4. Chronic systolic heart failure (H)  This appears to be stable.  I will monitor her lytes and renal fxn and provide some refills to get her through to her cardiology appointment, which I advised is still necessary.    - metoprolol succinate ER (TOPROL-XL) 50 MG 24 hr tablet; Take 1 tablet (50 mg) by mouth At Bedtime  Dispense: 90 tablet; Refill: 1  - losartan (COZAAR) 50 MG tablet; Take 1 tablet (50 mg) by mouth daily  Dispense: 90 tablet; Refill: 1  - furosemide (LASIX) 20 MG tablet; Take 1 tablet (20 mg) by mouth daily  Dispense: 90 tablet; Refill: 1    5. Vitamin D deficiency    - Vitamin D Deficiency           No follow-ups on file.    Annie Jolly MD  [facility]

## 2019-05-31 ENCOUNTER — TELEPHONE (OUTPATIENT)
Dept: FAMILY MEDICINE | Facility: CLINIC | Age: 64
End: 2019-05-31

## 2019-05-31 ENCOUNTER — NURSE TRIAGE (OUTPATIENT)
Dept: NURSING | Facility: CLINIC | Age: 64
End: 2019-05-31

## 2019-05-31 DIAGNOSIS — E55.9 VITAMIN D DEFICIENCY: Primary | ICD-10-CM

## 2019-06-01 NOTE — TELEPHONE ENCOUNTER
"Clinic Action Needed: Yes, please contact patient, (patient's daughter), via [employer]    FNA Triage Call  Presenting Problem:    Patient's daughter, Brandon calling about expected prescription not at pharmacy.    Consent to communicate verified on chart.    She was expecting prescription Vitamin D for her mother Damon.    Chart review shows Vitamin D deficiency noted in patient's problem list.  No mention though of a prescription for it.    Daughter had purchased OTC Vitamin D but she states her mother does not want to take it because it \"doesn't look like a prescription\" from a doctor.    Does Damon need prescription Vitamin D?  If not, what dosage of OTC Vitamin D should she take?    Will send message to clinic.  Daughter will send message via [employer].    Caty Espinoza RN  [facility]    Routed to: [id number]      "

## 2019-06-01 NOTE — TELEPHONE ENCOUNTER
"Patient's daughter, Brandon calling about expected prescription not at pharmacy.    Consent to communicate verified on chart.    She was expecting prescription Vitamin D for her mother Damon.    Chart review shows Vitamin D deficiency noted in patient's problem list.  No mention though of a prescription for it.    Daughter had purchased OTC Vitamin D but she states her mother does not want to take it because it \"doesn't look like a prescription\" from a doctor.    Will send message to clinic.  Daughter will send message via [employer].    Caty Espinoza RN  [facility]        Reason for Disposition    Caller has medication question only, adult not sick, and triager answers question    Protocols used: MEDICATION QUESTION CALL-A-AH      "

## 2019-06-03 NOTE — TELEPHONE ENCOUNTER
Patient is calling about Vitamin D script still not being at the pharmacy for her mother.   Please advise and send to:   [id number] - [place], [place] - [address]  [address]  [place] [address]  Phone: [phone] Fax: [phone]      Cathi AVALOS     [facility]

## 2019-06-03 NOTE — TELEPHONE ENCOUNTER
Return call discussed rx available at pharmacy - agree with plan    Closing encounter - no further actions needed at this time    Keyana Dior RN

## 2019-06-24 ENCOUNTER — OFFICE VISIT (OUTPATIENT)
Dept: CARDIOLOGY | Facility: CLINIC | Age: 64
End: 2019-06-24
Attending: INTERNAL MEDICINE
Payer: COMMERCIAL

## 2019-06-24 VITALS
HEART RATE: 54 BPM | SYSTOLIC BLOOD PRESSURE: 157 MMHG | HEIGHT: 62 IN | WEIGHT: 156 LBS | BODY MASS INDEX: 28.71 KG/M2 | OXYGEN SATURATION: 100 % | DIASTOLIC BLOOD PRESSURE: 84 MMHG

## 2019-06-24 DIAGNOSIS — I50.22 CHRONIC SYSTOLIC CONGESTIVE HEART FAILURE (H): ICD-10-CM

## 2019-06-24 PROCEDURE — 99214 OFFICE O/P EST MOD 30 MIN: CPT | Mod: ZP | Performed by: INTERNAL MEDICINE

## 2019-06-24 PROCEDURE — G0463 HOSPITAL OUTPT CLINIC VISIT: HCPCS | Mod: ZF

## 2019-06-24 ASSESSMENT — MIFFLIN-ST. JEOR: SCORE: 1215.86

## 2019-06-24 ASSESSMENT — PAIN SCALES - GENERAL: PAINLEVEL: NO PAIN (0)

## 2019-06-24 NOTE — PATIENT INSTRUCTIONS
Patient to follow up with PCP from now on.  No changes in medications    Lara Miguel, RN  Cardiology Care Coordinator  Please be aware that I work part-time but I will be checking messages several times per week.   For urgent needs, please call the number below.    [phone], press 1 for [employer], press 3 to speak to a nurse    .

## 2019-06-24 NOTE — NURSING NOTE
Chief Complaint   Patient presents with     Follow Up     HF with severe MR last visit. 12/2017     Vitals were taken and medications were reconciled.     ELIER Orta

## 2019-06-24 NOTE — PROGRESS NOTES
HPI: 65 yo female with moderate to severe MR with moderate cardiomyopathy who presents with daughter and interpretor.  Pt reports not wanting to undergo further testing and/or MitraClip evaluation.  Denies new CP, SOB, LOMELI, fatigue, bipedal edema.  At this point she does not want further evaluation of mitral valve or cardiomyopathy.  Explained extensively the importance of timing and need to evaluate and repair MV.  Pt understands but does not wish to undertake any additional tests.  Daughter understands and agrees with parent.      PAST MEDICAL HISTORY:  Past Medical History:   Diagnosis Date     Asthma      Cardiomyopathy, unspecified (H) / EF40% 10/5/2015     CHF (congestive heart failure) (H)      Hyperlipidemia      Kidney stone 10/2013     Lipoma 2000     Menopause      Mitral valve insufficiency 10/5/2015     Vitamin D deficiency        FAMILY HISTORY:  No family history on file.    SOCIAL HISTORY:  Social History     Socioeconomic History     Marital status:      Spouse name: None     Number of children: None     Years of education: None     Highest education level: None   Occupational History     None   Social Needs     Financial resource strain: None     Food insecurity:     Worry: None     Inability: None     Transportation needs:     Medical: None     Non-medical: None   Tobacco Use     Smoking status: Never Smoker     Smokeless tobacco: Never Used   Substance and Sexual Activity     Alcohol use: No     Drug use: No     Sexual activity: Yes     Partners: Male     Birth control/protection: None   Lifestyle     Physical activity:     Days per week: None     Minutes per session: None     Stress: None   Relationships     Social connections:     Talks on phone: None     Gets together: None     Attends [employer]ism service: None     Active member of club or organization: None     Attends meetings of clubs or organizations: None     Relationship status: None     Intimate partner violence:     Fear of  "current or ex partner: None     Emotionally abused: None     Physically abused: None     Forced sexual activity: None   Other Topics Concern     Parent/sibling w/ CABG, MI or angioplasty before 65F 55M? No      Service Not Asked     Blood Transfusions Not Asked     Caffeine Concern Yes     Occupational Exposure Not Asked     Hobby Hazards Not Asked     Sleep Concern Not Asked     Stress Concern Not Asked     Weight Concern Not Asked     Special Diet Not Asked     Back Care Not Asked     Exercise Yes     Comment: walking     Bike Helmet Not Asked     Seat Belt Not Asked     Self-Exams Not Asked   Social History Narrative     None       CURRENT MEDICATIONS:  Current Outpatient Medications   Medication Sig Dispense Refill     aspirin 81 MG tablet Take 1 tablet (81 mg) by mouth daily 100 tablet 3     Cyanocobalamin (VITAMIN B 12 PO) Take 1,000 mcg by mouth daily       furosemide (LASIX) 20 MG tablet Take 1 tablet (20 mg) by mouth daily 90 tablet 1     losartan (COZAAR) 50 MG tablet Take 1 tablet (50 mg) by mouth daily 90 tablet 1     metoprolol succinate ER (TOPROL-XL) 50 MG 24 hr tablet Take 1 tablet (50 mg) by mouth At Bedtime 90 tablet 1     UNABLE TO FIND daily MEDICATION NAME: Vitamin D with something for joints       vitamin D3 (CHOLECALCIFEROL) 15663 units capsule Take 1 capsule (50,000 Units) by mouth every 7 days for 8 doses 8 capsule 0       ROS:   Constitutional: No fever, chills, or sweats. No weight gain/loss.   ENT: No visual disturbance, ear ache, epistaxis, sore throat.   Allergies/Immunologic: Negative.   Respiratory: No cough, hemoptysis.   Cardiovascular: As per HPI.   GI: No nausea, vomiting, hematemesis, melena, or hematochezia.   : No urinary frequency, dysuria, or hematuria.   Integument: Negative.   Psychiatric: Negative.   Neuro: Negative.   Endocrinology: Negative.   Musculoskeletal: Negative.    EXAM:  /84   Pulse 54   Ht 1.575 m (5' 2\")   Wt 70.8 kg (156 lb)   LMP  (LMP " Unknown)   SpO2 100%   BMI 28.53 kg/m    General: appears comfortable, alert and articulate  Head: normocephalic, atraumatic  Eyes: anicteric sclera, EOMI  Neck: no adenopathy  Orophyarynx: moist mucosa, no lesions, dentition intact  Heart: regular, S1/S2, III/VI HSM at apex, gallop, rub, estimated JVP 7 cm  Lungs: clear, no rales or wheezing  Abdomen: soft, non-tender, bowel sounds present, no hepatosplenomegaly  Extremities: no clubbing, cyanosis or edema  Neurological: normal speech and affect, no gross motor deficits    Labs:  CBC RESULTS:  Lab Results   Component Value Date    WBC 6.1 02/11/2019    RBC 4.15 02/11/2019    HGB 12.0 02/11/2019    HCT 37.0 02/11/2019    MCV 89 02/11/2019    MCH 28.9 02/11/2019    MCHC 32.4 02/11/2019    RDW 13.0 02/11/2019     02/11/2019       CMP RESULTS:  Lab Results   Component Value Date     05/22/2019    POTASSIUM 4.5 05/22/2019    CHLORIDE 108 05/22/2019    CO2 25 05/22/2019    ANIONGAP 7 05/22/2019     (H) 05/22/2019    BUN 16 05/22/2019    CR 0.80 05/22/2019    GFRESTIMATED 78 05/22/2019    GFRESTBLACK >90 05/22/2019    MIKE 9.7 05/22/2019    BILITOTAL 0.3 10/18/2017    ALBUMIN 3.8 10/18/2017    ALKPHOS 67 10/18/2017    ALT 27 10/18/2017    AST 15 10/18/2017        INR RESULTS:  No results found for: INR    No results found for: MAG  Lab Results   Component Value Date    NTBNPI 113 05/18/2018     Lab Results   Component Value Date    NTBNP 254 (H) 03/09/2016       Assessment and Plan:   Moderate to severe MR with valvular cardiomyopathy in patient that does not want further testing.  Recommend that pt continue care with primary care physicians and only return to cardiology if pt wants to repair MV.  Pt and family understands that valvular cardiomyopathy is progressive and there may come a time where the risk of repair is too great.  Spent 30 min discussing these issues and care plans.  Pt and family  are in agreement with not pursuing any procedures at  this time.     Flash Mirza MD, PhD  Professor, Heart Failure and Cardiac Transplantation  [facility]  CC  Patient Care Team:  Aileen Pizano APRN CNP as PCP - General (Nurse Practitioner)  Flash Mirza MD as MD (Cardiology)  Molly George MD as Assigned PCP  Annie Jolly MD as Assigned PCP  SELF, REFERRED

## 2019-06-24 NOTE — LETTER
6/24/2019      RE: Damon Gregory  [address] [place] [address]       Dear Colleague,    Thank you for the opportunity to participate in the care of your patient, Damon Gregory, at the [facility]. Please see a copy of my visit note below.    HPI: 65 yo female with moderate to severe MR with moderate cardiomyopathy who presents with daughter and interpretor.  Pt reports not wanting to undergo further testing and/or MitraClip evaluation.  Denies new CP, SOB, LOMELI, fatigue, bipedal edema.  At this point she does not want further evaluation of mitral valve or cardiomyopathy.  Explained extensively the importance of timing and need to evaluate and repair MV.  Pt understands but does not wish to undertake any additional tests.  Daughter understands and agrees with parent.      PAST MEDICAL HISTORY:  Past Medical History:   Diagnosis Date     Asthma      Cardiomyopathy, unspecified (H) / EF40% 10/5/2015     CHF (congestive heart failure) (H)      Hyperlipidemia      Kidney stone 10/2013     Lipoma 2000     Menopause      Mitral valve insufficiency 10/5/2015     Vitamin D deficiency        FAMILY HISTORY:  No family history on file.    SOCIAL HISTORY:  Social History     Socioeconomic History     Marital status:      Spouse name: None     Number of children: None     Years of education: None     Highest education level: None   Occupational History     None   Social Needs     Financial resource strain: None     Food insecurity:     Worry: None     Inability: None     Transportation needs:     Medical: None     Non-medical: None   Tobacco Use     Smoking status: Never Smoker     Smokeless tobacco: Never Used   Substance and Sexual Activity     Alcohol use: No     Drug use: No     Sexual activity: Yes     Partners: Male     Birth control/protection: None   Lifestyle     Physical activity:     Days per week: None     Minutes per session: None     Stress:  None   Relationships     Social connections:     Talks on phone: None     Gets together: None     Attends [employer] service: None     Active member of club or organization: None     Attends meetings of clubs or organizations: None     Relationship status: None     Intimate partner violence:     Fear of current or ex partner: None     Emotionally abused: None     Physically abused: None     Forced sexual activity: None   Other Topics Concern     Parent/sibling w/ CABG, MI or angioplasty before 65F 55M? No      Service Not Asked     Blood Transfusions Not Asked     Caffeine Concern Yes     Occupational Exposure Not Asked     Hobby Hazards Not Asked     Sleep Concern Not Asked     Stress Concern Not Asked     Weight Concern Not Asked     Special Diet Not Asked     Back Care Not Asked     Exercise Yes     Comment: walking     Bike Helmet Not Asked     Seat Belt Not Asked     Self-Exams Not Asked   Social History Narrative     None       CURRENT MEDICATIONS:  Current Outpatient Medications   Medication Sig Dispense Refill     aspirin 81 MG tablet Take 1 tablet (81 mg) by mouth daily 100 tablet 3     Cyanocobalamin (VITAMIN B 12 PO) Take 1,000 mcg by mouth daily       furosemide (LASIX) 20 MG tablet Take 1 tablet (20 mg) by mouth daily 90 tablet 1     losartan (COZAAR) 50 MG tablet Take 1 tablet (50 mg) by mouth daily 90 tablet 1     metoprolol succinate ER (TOPROL-XL) 50 MG 24 hr tablet Take 1 tablet (50 mg) by mouth At Bedtime 90 tablet 1     UNABLE TO FIND daily MEDICATION NAME: Vitamin D with something for joints       vitamin D3 (CHOLECALCIFEROL) 59082 units capsule Take 1 capsule (50,000 Units) by mouth every 7 days for 8 doses 8 capsule 0       ROS:   Constitutional: No fever, chills, or sweats. No weight gain/loss.   ENT: No visual disturbance, ear ache, epistaxis, sore throat.   Allergies/Immunologic: Negative.   Respiratory: No cough, hemoptysis.   Cardiovascular: As per HPI.   GI: No nausea, vomiting,  "hematemesis, melena, or hematochezia.   : No urinary frequency, dysuria, or hematuria.   Integument: Negative.   Psychiatric: Negative.   Neuro: Negative.   Endocrinology: Negative.   Musculoskeletal: Negative.    EXAM:  /84   Pulse 54   Ht 1.575 m (5' 2\")   Wt 70.8 kg (156 lb)   LMP  (LMP Unknown)   SpO2 100%   BMI 28.53 kg/m     General: appears comfortable, alert and articulate  Head: normocephalic, atraumatic  Eyes: anicteric sclera, EOMI  Neck: no adenopathy  Orophyarynx: moist mucosa, no lesions, dentition intact  Heart: regular, S1/S2, III/VI HSM at apex, gallop, rub, estimated JVP 7 cm  Lungs: clear, no rales or wheezing  Abdomen: soft, non-tender, bowel sounds present, no hepatosplenomegaly  Extremities: no clubbing, cyanosis or edema  Neurological: normal speech and affect, no gross motor deficits    Labs:  CBC RESULTS:  Lab Results   Component Value Date    WBC 6.1 02/11/2019    RBC 4.15 02/11/2019    HGB 12.0 02/11/2019    HCT 37.0 02/11/2019    MCV 89 02/11/2019    MCH 28.9 02/11/2019    MCHC 32.4 02/11/2019    RDW 13.0 02/11/2019     02/11/2019       CMP RESULTS:  Lab Results   Component Value Date     05/22/2019    POTASSIUM 4.5 05/22/2019    CHLORIDE 108 05/22/2019    CO2 25 05/22/2019    ANIONGAP 7 05/22/2019     (H) 05/22/2019    BUN 16 05/22/2019    CR 0.80 05/22/2019    GFRESTIMATED 78 05/22/2019    GFRESTBLACK >90 05/22/2019    MIKE 9.7 05/22/2019    BILITOTAL 0.3 10/18/2017    ALBUMIN 3.8 10/18/2017    ALKPHOS 67 10/18/2017    ALT 27 10/18/2017    AST 15 10/18/2017        INR RESULTS:  No results found for: INR    No results found for: MAG  Lab Results   Component Value Date    NTBNPI 113 05/18/2018     Lab Results   Component Value Date    NTBNP 254 (H) 03/09/2016       Assessment and Plan:   Moderate to severe MR with valvular cardiomyopathy in patient that does not want further testing.  Recommend that pt continue care with primary care physicians and only " return to cardiology if pt wants to repair MV.  Pt and family understands that valvular cardiomyopathy is progressive and there may come a time where the risk of repair is too great.  Spent 30 min discussing these issues and care plans.  Pt and family  are in agreement with not pursuing any procedures at this time.     Flash Mirza MD, PhD  Professor, Heart Failure and Cardiac Transplantation  [facility]    CC  Patient Care Team:  Aileen Pizano APRN CNP as PCP - General (Nurse Practitioner)  Flash Mirza MD as MD (Cardiology)  Molly George MD as Assigned PCP  Annie Jolly MD as Assigned PCP  SELF, REFERRED

## 2019-07-19 DIAGNOSIS — I50.22 CHRONIC SYSTOLIC HEART FAILURE (H): ICD-10-CM

## 2019-07-19 NOTE — TELEPHONE ENCOUNTER
"Requested Prescriptions   Pending Prescriptions Disp Refills     losartan (COZAAR) 50 MG tablet [Pharmacy Med Name: LOSARTAN POTASSIUM 50 MG TAB] 90 tablet 0     Sig: TAKE 1 TABLET (50 MG) BY MOUTH DAILY    Last Written Prescription Date:  5/22/2019  Last Fill Quantity: 90 tablet    ,  # refills: 1   Last Office Visit: 5/22/2019   Future Office Visit:            Angiotensin-II Receptors Failed - 7/19/2019 12:35 PM        Failed - Blood pressure under 140/90 in past 12 months     BP Readings from Last 3 Encounters:   06/24/19 157/84   05/22/19 139/68   02/11/19 133/70           Passed - Recent (12 mo) or future (30 days) visit within the authorizing provider's specialty     Patient had office visit in the last 12 months or has a visit in the next 30 days with authorizing provider or within the authorizing provider's specialty.  See \"Patient Info\" tab in inbasket, or \"Choose Columns\" in Meds & Orders section of the refill encounter.          Passed - Medication is active on med list        Passed - Patient is age 18 or older        Passed - No active pregnancy on record        Passed - Normal serum creatinine on file in past 12 months     Recent Labs   Lab Test 05/22/19  1037   CR 0.80           Passed - Normal serum potassium on file in past 12 months     Recent Labs   Lab Test 05/22/19  1037   POTASSIUM 4.5           Passed - No positive pregnancy test in past 12 months          "

## 2019-07-20 RX ORDER — LOSARTAN POTASSIUM 50 MG/1
50 TABLET ORAL DAILY
Qty: 0.1 TABLET | Refills: 0 | OUTPATIENT
Start: 2019-07-20

## 2019-07-20 NOTE — TELEPHONE ENCOUNTER
Refused Prescriptions:                       Disp   Refills    losartan (COZAAR) 50 MG tablet [Pharmacy M*0.1 ta*0        Sig: TAKE 1 TABLET (50 MG) BY MOUTH DAILY  Refused By: IMANI GASPAR  Reason for Refusal: Duplicate

## 2019-07-26 DIAGNOSIS — E55.9 VITAMIN D DEFICIENCY: ICD-10-CM

## 2019-07-26 NOTE — TELEPHONE ENCOUNTER
"Requested Prescriptions   Pending Prescriptions Disp Refills     cholecalciferol (VITAMIN D3) 54551 units (1250 mcg) capsule [Pharmacy Med Name: VITAMIN D3 50,000 UNIT CAPSULE] 8 capsule 0     Sig: TAKE 1 CAPSULE (50,000 UNITS) BY MOUTH EVERY 7 DAYS FOR 8 DOSES  Last Written Prescription Date:  6/3/2019  Last Fill Quantity: 8capsule,  # refills: 0   Last Office Visit: 5/22/2019 Rik  Future Office Visit:            Vitamin Supplements (Adult) Protocol Failed - 7/26/2019  3:51 AM        Failed - High dose Vitamin D not ordered        Passed - Recent (12 mo) or future (30 days) visit within the authorizing provider's specialty     Patient had office visit in the last 12 months or has a visit in the next 30 days with authorizing provider or within the authorizing provider's specialty.  See \"Patient Info\" tab in inbasket, or \"Choose Columns\" in Meds & Orders section of the refill encounter.              Passed - Medication is active on med list          "

## 2019-07-29 RX ORDER — CHOLECALCIFEROL (VITAMIN D3) 1250 MCG
CAPSULE ORAL
Qty: 0.1 CAPSULE | Refills: 0 | OUTPATIENT
Start: 2019-07-29

## 2020-01-27 DIAGNOSIS — I50.22 CHRONIC SYSTOLIC HEART FAILURE (H): ICD-10-CM

## 2020-01-27 NOTE — TELEPHONE ENCOUNTER
"Requested Prescriptions   Pending Prescriptions Disp Refills     losartan (COZAAR) 50 MG tablet  Last Written Prescription Date:  5-22-19  Last Fill Quantity: 90 tab,  # refills: 1   Last office visit: 5/22/2019 with prescribing provider:  Annie Jolly   Future Office Visit:   90 tablet 1     Sig: Take 1 tablet (50 mg) by mouth daily       Angiotensin-II Receptors Failed - 1/27/2020 10:57 AM        Failed - Last blood pressure under 140/90 in past 12 months     BP Readings from Last 3 Encounters:   06/24/19 157/84   05/22/19 139/68   02/11/19 133/70           Passed - Recent (12 mo) or future (30 days) visit within the authorizing provider's specialty     Patient has had an office visit with the authorizing provider or a provider within the authorizing providers department within the previous 12 mos or has a future within next 30 days. See \"Patient Info\" tab in inbasket, or \"Choose Columns\" in Meds & Orders section of the refill encounter.            Passed - Medication is active on med list        Passed - Patient is age 18 or older        Passed - No active pregnancy on record        Passed - Normal serum creatinine on file in past 12 months     Recent Labs   Lab Test 05/22/19  1037   CR 0.80           Passed - Normal serum potassium on file in past 12 months     Recent Labs   Lab Test 05/22/19  1037   POTASSIUM 4.5           Passed - No positive pregnancy test in past 12 months         "

## 2020-01-27 NOTE — LETTER
[facility]  [address] [place] [address]  Phone: [phone]    02/04/20    Damon Gregory  [address] [place] [address]      To whom it may concern:     In order to ensure we are providing the best quality care, we have reviewed your chart and see   that you are due for : a follow-up appointment for further refills on losartan (COZAAR) 50 MG tablet.        Please call the clinic at [phone] at your earliest convenience to schedule an appointment.  We greatly appreciate the opportunity to serve you . Thank you for trusting us with your health care.    Your [facility] Healthcare Team

## 2020-01-28 NOTE — TELEPHONE ENCOUNTER
Please call this patient.  This is a medication that had been managed by Dr. Jolly.  She needs to schedule an appointment with a new PCP.  An option would be to send this refill request to cardiology for consideration of the refill.

## 2020-01-28 NOTE — TELEPHONE ENCOUNTER
Routing refill request to provider for review/approval because:  BP not at goal at cardiology - elevation not addressed in office visit notes

## 2020-02-03 NOTE — TELEPHONE ENCOUNTER
Called and reached patients daughter. States she will coordinate with her sister as to when works best for transportation and return call to clinic for scheduling.

## 2020-02-04 NOTE — TELEPHONE ENCOUNTER
No appt scheduled yet. Sending letter.    Routing to nurse team due to pending medication.    Shannon DIEGO     [facility]

## 2020-02-11 RX ORDER — LOSARTAN POTASSIUM 50 MG/1
50 TABLET ORAL DAILY
Qty: 90 TABLET | OUTPATIENT
Start: 2020-02-11

## 2020-02-11 NOTE — TELEPHONE ENCOUNTER
losartan (COZAAR) 50 MG tablet  Last Written Prescription Date:  5/22/2019  Last Fill Quantity: 90,   # refills: 1  Last Office Visit : 6/24/2019  Future Office visit:  Cardiology    Routing refill request to provider for review/approval because:  Refill medication??   Follow up office visit for Pt care??  Follow up B/P Check??   Refer to Cardiology for review and refills.   06/24/19 157/84   05/22/19 139/68   02/11/19 133/70       Annie Slaughter RN  Central Triage Red Flags/Med Refills

## 2020-02-11 NOTE — TELEPHONE ENCOUNTER
It appears that this patient is most consistent with cardiology.  She comes to Family practice for acute needs (calf pain, pneumonia, etc).  It also appears that Dr. Jolly refilled her meds at her last appoinment and Dr. Jolly is no longer at this clinic.    I would defer all of her refills to cardiology as these are all heart/blood pressure medications.    She is also due to run out of her lasix and metoprolol at the same time so I am unsure of why only the cozaar came through as a refill.

## 2020-02-13 ENCOUNTER — TELEPHONE (OUTPATIENT)
Dept: FAMILY MEDICINE | Facility: CLINIC | Age: 65
End: 2020-02-13

## 2020-02-13 DIAGNOSIS — I50.22 CHRONIC SYSTOLIC HEART FAILURE (H): Primary | ICD-10-CM

## 2020-02-13 RX ORDER — LOSARTAN POTASSIUM 50 MG/1
50 TABLET ORAL DAILY
Qty: 90 TABLET | Refills: 0 | Status: SHIPPED | OUTPATIENT
Start: 2020-02-13 | End: 2020-06-11

## 2020-02-13 NOTE — LETTER
[facility]  [address] [place] [address]  Phone: [phone]    03/04/20    Damon Bri  [address] [place] [address]      To whom it may concern:     We have tried to reach you for scheduling. Please return call to the clinic at [phone] to establish care with Sybil BOND who can better manage your health.    Sincerely,    Adriana Gongora    [facility]

## 2020-02-13 NOTE — TELEPHONE ENCOUNTER
To care coordinator,    Do you know which FP/IM providers do heart failure management? Will you be calling this pt?    Susanna Park RN, BSN  [facility]

## 2020-02-13 NOTE — TELEPHONE ENCOUNTER
Aileen thought any of the MD's here are fine. Either you or TC can call to schedule, up to you. I will call patient to follow up on the plan for her heart failure in general (ACP, refer to CORE, etc)-Thanks!    Michelle Lyles RN  [facility]-Care Coordination  [facility]  [facility]  [phone]

## 2020-02-13 NOTE — TELEPHONE ENCOUNTER
I sent in a referral for a care coordinator.  This patient has a complex history of congestive heart failure.    According to Dr. Mirza's last note, 6/24/2019, he did feel it was necessary that he see this patient again unless she wants surgery to repair her mitral valve.  I would request that this patient see either family practice/internal medicine MD who does heart failure management or she could see a general cardiologist for continuing her medications and medication adjustments as needed.    Please change this patient's upcoming appointment with me on 2/26/2020.

## 2020-02-13 NOTE — TELEPHONE ENCOUNTER
"Routing refill request to provider for review/approval because:  --This refill request was routed to [facility]  and it looks like [facility] refused to authorize refill.  The request was routed back to [facility].  \"Refused by: Asuncion Pantoja RN  Refusal reason: OTHER (Please send to PCP)\"  --Patient does now have an appointment at [place] 2/26/2020..      Last Office Visit: 5/22/2019   Future Office Visit:    Next 5 appointments (look out 90 days)    Feb 26, 2020  9:20 AM CST  PHYSICAL with DESIREE Quiles CNP  [facility] [address] [place] [address] [phone]                  "

## 2020-02-14 NOTE — TELEPHONE ENCOUNTER
Called and left VM to return call to clinic for scheduling with Sybil Casillas to establish care and heart failure management. If patient returns call - same day hold with Sybil Casillas is approved per Emmy Herrera.

## 2020-02-14 NOTE — TELEPHONE ENCOUNTER
Adriana--this appointment was to be changed for the patient to see a MD due to her medical complexity.

## 2020-02-14 NOTE — TELEPHONE ENCOUNTER
Reception:    Please call patient and schedule with Dr. Sybil Casillas to establish care as her PCP.    Michelle Lyles, KATELYN CC will contact patient and get her scheduled with Cardiology for heart failure care.    Thanks!    Emmy Herrera MSN, RN  Patient Care Supervisor  [facility] and  [facility]

## 2020-02-28 NOTE — TELEPHONE ENCOUNTER
She no showed the appointment. I was going to touch base with her with regards to her heart failure but was not able to outreach yet. Can you please take over? Thanks!      Michelle

## 2020-03-04 NOTE — TELEPHONE ENCOUNTER
Attempted to reach patient again for scheduling with MD. Left JOSEPHINE on Melvina's line as well as called Brandon. Will send letter.

## 2020-03-04 NOTE — TELEPHONE ENCOUNTER
Left message on answering machine for Melvina to call back so we can make a follow up visit.  Mary Hansen RN

## 2020-03-10 ENCOUNTER — HEALTH MAINTENANCE LETTER (OUTPATIENT)
Age: 65
End: 2020-03-10

## 2020-03-13 NOTE — TELEPHONE ENCOUNTER
Per provider: This appointment was to be changed for the patient to see a MD due to her medical complexity.

## 2020-03-13 NOTE — TELEPHONE ENCOUNTER
Unable to change the appointment without reaching the patient. She has been called 3 different times, left 2 voicemail's on all contact numbers listed and mailed her a letter. Is the intention to just cancel the appointment even if she hasn't been reached or notified? Writer is aware that she needs to see a MD for her medical complexity pertaining to heart failure management, though is scheduled for a physical. Looks like Michelle Lyles has also tried to contact the patient. Multiple outreach attempt have been made. Please inform what else writer should do? Would you like the appointment cancelled?

## 2020-03-16 NOTE — TELEPHONE ENCOUNTER
THIS PATIENT HAS A 3/24/2020 appointment with me and she needs to establish care with a physician.   Please switch her to a physician schedule.

## 2020-06-05 DIAGNOSIS — I50.22 CHRONIC SYSTOLIC HEART FAILURE (H): ICD-10-CM

## 2020-06-09 ENCOUNTER — OFFICE VISIT (OUTPATIENT)
Dept: FAMILY MEDICINE | Facility: CLINIC | Age: 65
End: 2020-06-09
Payer: COMMERCIAL

## 2020-06-09 VITALS
WEIGHT: 156 LBS | BODY MASS INDEX: 28.53 KG/M2 | SYSTOLIC BLOOD PRESSURE: 112 MMHG | RESPIRATION RATE: 18 BRPM | OXYGEN SATURATION: 99 % | DIASTOLIC BLOOD PRESSURE: 62 MMHG | HEART RATE: 50 BPM

## 2020-06-09 DIAGNOSIS — E55.9 VITAMIN D DEFICIENCY: ICD-10-CM

## 2020-06-09 DIAGNOSIS — I50.9 CHRONIC CONGESTIVE HEART FAILURE, UNSPECIFIED HEART FAILURE TYPE (H): ICD-10-CM

## 2020-06-09 DIAGNOSIS — E78.5 HYPERLIPIDEMIA, UNSPECIFIED HYPERLIPIDEMIA TYPE: Primary | ICD-10-CM

## 2020-06-09 DIAGNOSIS — R73.03 PREDIABETES: ICD-10-CM

## 2020-06-09 DIAGNOSIS — I34.0 MITRAL VALVE INSUFFICIENCY, UNSPECIFIED ETIOLOGY: ICD-10-CM

## 2020-06-09 LAB — HBA1C MFR BLD: 5.5 % (ref 0–5.6)

## 2020-06-09 PROCEDURE — 83036 HEMOGLOBIN GLYCOSYLATED A1C: CPT | Performed by: FAMILY MEDICINE

## 2020-06-09 PROCEDURE — 80053 COMPREHEN METABOLIC PANEL: CPT | Performed by: FAMILY MEDICINE

## 2020-06-09 PROCEDURE — 99214 OFFICE O/P EST MOD 30 MIN: CPT | Performed by: FAMILY MEDICINE

## 2020-06-09 PROCEDURE — 82306 VITAMIN D 25 HYDROXY: CPT | Performed by: FAMILY MEDICINE

## 2020-06-09 PROCEDURE — 36415 COLL VENOUS BLD VENIPUNCTURE: CPT | Performed by: FAMILY MEDICINE

## 2020-06-09 PROCEDURE — 80061 LIPID PANEL: CPT | Performed by: FAMILY MEDICINE

## 2020-06-09 RX ORDER — METOPROLOL SUCCINATE 50 MG/1
50 TABLET, EXTENDED RELEASE ORAL AT BEDTIME
Qty: 90 TABLET | Refills: 0 | Status: SHIPPED | OUTPATIENT
Start: 2020-06-09 | End: 2020-08-24

## 2020-06-09 NOTE — PROGRESS NOTES
Subjective     Damon Gregory is a 65 year old female who presents to clinic today for the following health issues:    HPI   New patient to me today.    Current Chronic Medical Conditions  Severe MR with valvular CM last EF 40%  Hx of asthma  Hx of hyperlipidemia  Hx of kidney stones  Menopause  Vitamin D deficiency  Prediabetes    Social History  .  4 kids- 3 daughters and 1 son.  Here today with daughter who is RN at [employer].  15 grandkids.  Grew up in [place]- multiple untreated strep infections.    In past- patient has been afraid to consider surgery for mitral valve replacement afraid of the anesthesia never having had surgery before.  She is worried she might die in surgery.    Daughter is very supportive of mom having surgery.      She is overdue for labs- needs CMP and A1c as well as Vitamin D level and lipids.  Patient is fasting today.    No acute concerns today.  Will get winded climbing stairs.  HR is low when daughter checks it in the 50s-60s.  No SOB today.  No BLE edema.  No PND or orthopnea.    Patient Active Problem List   Diagnosis     Chronic congestive heart failure, unspecified congestive heart failure type     Multiple joint pain     Hyperlipidemia     Cardiomyopathy, unspecified (H) / EF40%     Mitral valve insufficiency/ mod to severe     History reviewed. No pertinent surgical history.    Social History     Tobacco Use     Smoking status: Never Smoker     Smokeless tobacco: Never Used   Substance Use Topics     Alcohol use: No     History reviewed. No pertinent family history.      Current Outpatient Medications   Medication Sig Dispense Refill     aspirin 81 MG tablet Take 1 tablet (81 mg) by mouth daily 100 tablet 3     Cyanocobalamin (VITAMIN B 12 PO) Take 1,000 mcg by mouth daily       furosemide (LASIX) 20 MG tablet Take 1 tablet (20 mg) by mouth daily 90 tablet 1     losartan (COZAAR) 50 MG tablet Take 1 tablet (50 mg) by mouth daily 90 tablet 0      metoprolol succinate ER (TOPROL-XL) 50 MG 24 hr tablet Take 1 tablet (50 mg) by mouth At Bedtime 90 tablet 0     UNABLE TO FIND daily MEDICATION NAME: Vitamin D with something for joints       No Known Allergies  Recent Labs   Lab Test 05/22/19  1037 05/18/18  2354 10/18/17  0909  03/09/16  0837  10/01/15   *  --  120*  --  115*  --  117   HDL 46*  --  50  --  41*  --  42   TRIG 196*  --  168*  --  127  --  73   ALT  --   --  27  --  41  --  31   CR 0.80 0.84 0.71   < > 0.74   < > 0.73   GFRESTIMATED 78 68 84   < > 79  --   --    GFRESTBLACK >90 83 >90   < > >90  African American GFR Calc    --   --    POTASSIUM 4.5 4.1 4.4   < > 3.9   < > 4.5    < > = values in this interval not displayed.      BP Readings from Last 3 Encounters:   06/09/20 112/62   06/24/19 157/84   05/22/19 139/68    Wt Readings from Last 3 Encounters:   06/09/20 70.8 kg (156 lb)   06/24/19 70.8 kg (156 lb)   05/22/19 70.3 kg (155 lb)                    Reviewed and updated as needed this visit by Provider         Review of Systems   Constitutional, HEENT, cardiovascular, pulmonary, GI, , musculoskeletal, neuro, skin, endocrine and psych systems are negative, except as otherwise noted.      Objective    LMP  (LMP Unknown)   There is no height or weight on file to calculate BMI.  Physical Exam   General: appears comfortable, alert and articulate  Head: normocephalic, atraumatic  Eyes: anicteric sclera, EOMI  Neck: no adenopathy  Orophyarynx: moist mucosa, no lesions, dentition intact  Heart: regular, S1/S2, III/VI HSM at apex, gallop, rub, estimated JVP 7 cm  Lungs: clear, no rales or wheezing  Abdomen: soft, non-tender, bowel sounds present, no hepatosplenomegaly  Extremities: no clubbing, cyanosis or edema  Neurological: normal speech and affect, no gross motor deficits          Assessment & Plan     1. Hyperlipidemia, unspecified hyperlipidemia type    - Comprehensive metabolic panel  - Lipid panel reflex to direct LDL  Fasting    Fasting labs today.    2. Chronic congestive heart failure, unspecified heart failure type (H)    - CARDIOLOGY EVAL ADULT REFERRAL; Future    3. Vitamin D deficiency    - 25- [place]-Vitamin D    Recheck of D level.    4. Prediabetes    - Comprehensive metabolic panel  - Lipid panel reflex to direct LDL Fasting  - Hemoglobin A1c    Labs today for follow up.    5. Mitral valve insufficiency, unspecified etiology    - CARDIOLOGY EVAL ADULT REFERRAL; Future     Spent 25 minutes with patient- 15 minutes spent in direct counseling regarding the benefits of mitral valve repair.  Referred to Cards now wishing to proceed with MV repair.  Daughter to help schedule.    Molly George MD  [facility]

## 2020-06-10 LAB
ALBUMIN SERPL-MCNC: 3.5 G/DL (ref 3.4–5)
ALP SERPL-CCNC: 56 U/L (ref 40–150)
ALT SERPL W P-5'-P-CCNC: 25 U/L (ref 0–50)
ANION GAP SERPL CALCULATED.3IONS-SCNC: 6 MMOL/L (ref 3–14)
AST SERPL W P-5'-P-CCNC: 14 U/L (ref 0–45)
BILIRUB SERPL-MCNC: 0.4 MG/DL (ref 0.2–1.3)
BUN SERPL-MCNC: 16 MG/DL (ref 7–30)
CALCIUM SERPL-MCNC: 8.5 MG/DL (ref 8.5–10.1)
CHLORIDE SERPL-SCNC: 109 MMOL/L (ref 94–109)
CHOLEST SERPL-MCNC: 192 MG/DL
CO2 SERPL-SCNC: 25 MMOL/L (ref 20–32)
CREAT SERPL-MCNC: 0.72 MG/DL (ref 0.52–1.04)
DEPRECATED CALCIDIOL+CALCIFEROL SERPL-MC: 24 UG/L (ref 20–75)
GFR SERPL CREATININE-BSD FRML MDRD: 89 ML/MIN/{1.73_M2}
GLUCOSE SERPL-MCNC: 83 MG/DL (ref 70–99)
HDLC SERPL-MCNC: 46 MG/DL
LDLC SERPL CALC-MCNC: 128 MG/DL
NONHDLC SERPL-MCNC: 146 MG/DL
POTASSIUM SERPL-SCNC: 4.2 MMOL/L (ref 3.4–5.3)
PROT SERPL-MCNC: 7.2 G/DL (ref 6.8–8.8)
SODIUM SERPL-SCNC: 140 MMOL/L (ref 133–144)
TRIGL SERPL-MCNC: 90 MG/DL

## 2020-06-11 ENCOUNTER — MYC REFILL (OUTPATIENT)
Dept: FAMILY MEDICINE | Facility: CLINIC | Age: 65
End: 2020-06-11

## 2020-06-11 ENCOUNTER — TELEPHONE (OUTPATIENT)
Dept: CARDIOLOGY | Facility: CLINIC | Age: 65
End: 2020-06-11

## 2020-06-11 ENCOUNTER — MYC MEDICAL ADVICE (OUTPATIENT)
Dept: FAMILY MEDICINE | Facility: CLINIC | Age: 65
End: 2020-06-11

## 2020-06-11 DIAGNOSIS — I50.22 CHRONIC SYSTOLIC HEART FAILURE (H): ICD-10-CM

## 2020-06-11 RX ORDER — LOSARTAN POTASSIUM 50 MG/1
50 TABLET ORAL DAILY
Qty: 90 TABLET | Refills: 0 | Status: SHIPPED | OUTPATIENT
Start: 2020-06-11 | End: 2020-07-29

## 2020-06-11 NOTE — TELEPHONE ENCOUNTER
Health Call Center    Phone Message    May a detailed message be left on voicemail: yes     Reason for Call: Other: Brandon calling on behalf of her mother Damon to schedule an appointment with Dr. Mirza. Writer was not able to pull up virtual visit appointment times for Dr. Mirza. Brandon would prefer to have an in clinic appointment if possible to discuss surgery with Dr. Mirza for Damon. Please give Brandon a call back at your earliest convenience.     Action Taken: Message routed to:  [facility] ([facility]):  Cardio    Travel Screening: Not Applicable

## 2020-06-11 NOTE — TELEPHONE ENCOUNTER
Prescription approved per [facility] Refill Protocol.    Leyda Park RN, BSN  [facility]

## 2020-06-11 NOTE — TELEPHONE ENCOUNTER
I don't have another specific Cardiologist in mind- daughter could ask scheduling there for their input? :)

## 2020-06-18 ENCOUNTER — TELEPHONE (OUTPATIENT)
Dept: CARDIOLOGY | Facility: CLINIC | Age: 65
End: 2020-06-18

## 2020-06-18 NOTE — TELEPHONE ENCOUNTER
[facility] Call Center    Phone Message    May a detailed message be left on voicemail: yes     Reason for Call: Other: Per my chart, patient is requesting to see Dr. Mirza during the time frame of 6/18 thru 6/26/2020. I am not seeing any appointments available. Please review and call patient to schedule.    Action Taken: Other: Cardiology    Travel Screening: Not Applicable

## 2020-06-18 NOTE — TELEPHONE ENCOUNTER
Sent message to Dr. Mirza asking if he wants to see patient in his 6/24/20 DOD Clinic. Waiting for response.  Agustina Verma RN

## 2020-06-18 NOTE — TELEPHONE ENCOUNTER
Sent a note to Dr. Mirza asking if he wants to see patient in his 6/24/20 DOD clinic. Waiting for response.  Agustina Verma RN

## 2020-06-22 NOTE — TELEPHONE ENCOUNTER
Patient's daughter (Brandon)  called and left a message on my direct line. She was looking to set up an appt with Hermes Hu. I informed her we were still waiting to hear from Dr. Mirza on if he wants to see her face to face on Weds the 24th.     I let her know that someone will be in touch with her tomorrow.     Brian Marti [facility]  Heart Failure, Advanced Heart Failure & CORE  Referral Specialist &     [facility]  Cardiology  Office: [phone]  [phone]

## 2020-06-23 NOTE — TELEPHONE ENCOUNTER
Per Dr. Mirza, Pt can be scheduled at next available clinic. He is currently booked out until August.  Agustina Verma RN

## 2020-06-23 NOTE — TELEPHONE ENCOUNTER
June 23, 2020 tried calling, LVM. Trying to make appt for tomorrow with ADV DEANDRE Marti CMA  Heart Failure, Advanced Heart Failure & CORE  Referral Specialist &     [facility]  Cardiology  Office: [phone]

## 2020-06-23 NOTE — TELEPHONE ENCOUNTER
Per Dr. Mirza I scheduled her on his normal clinic (mondays) at next available which was in August.     Brian Marti, [facility]  Heart Failure, Advanced Heart Failure & CORE  Referral Specialist &     [facility]  Cardiology  Office: [phone]  [phone]

## 2020-08-24 ENCOUNTER — OFFICE VISIT (OUTPATIENT)
Dept: CARDIOLOGY | Facility: CLINIC | Age: 65
End: 2020-08-24
Attending: INTERNAL MEDICINE
Payer: COMMERCIAL

## 2020-08-24 VITALS
HEART RATE: 59 BPM | DIASTOLIC BLOOD PRESSURE: 84 MMHG | SYSTOLIC BLOOD PRESSURE: 151 MMHG | OXYGEN SATURATION: 99 % | WEIGHT: 159 LBS | HEIGHT: 62 IN | BODY MASS INDEX: 29.26 KG/M2

## 2020-08-24 DIAGNOSIS — I50.9 CHRONIC CONGESTIVE HEART FAILURE, UNSPECIFIED HEART FAILURE TYPE (H): ICD-10-CM

## 2020-08-24 DIAGNOSIS — I50.22 CHRONIC SYSTOLIC HEART FAILURE (H): ICD-10-CM

## 2020-08-24 DIAGNOSIS — I34.0 MITRAL VALVE INSUFFICIENCY, UNSPECIFIED ETIOLOGY: ICD-10-CM

## 2020-08-24 PROCEDURE — G0463 HOSPITAL OUTPT CLINIC VISIT: HCPCS | Mod: ZF

## 2020-08-24 PROCEDURE — 99214 OFFICE O/P EST MOD 30 MIN: CPT | Mod: ZP | Performed by: INTERNAL MEDICINE

## 2020-08-24 RX ORDER — METOPROLOL SUCCINATE 50 MG/1
100 TABLET, EXTENDED RELEASE ORAL AT BEDTIME
Qty: 90 TABLET | Refills: 0 | Status: SHIPPED | OUTPATIENT
Start: 2020-08-24 | End: 2020-12-08

## 2020-08-24 ASSESSMENT — ENCOUNTER SYMPTOMS
FEVER: 0
STIFFNESS: 1
MYALGIAS: 1
HALLUCINATIONS: 0
POSTURAL DYSPNEA: 0
DECREASED APPETITE: 0
BACK PAIN: 0
NECK PAIN: 0
MUSCLE CRAMPS: 0
WHEEZING: 0
POLYDIPSIA: 0
SPUTUM PRODUCTION: 0
WEIGHT LOSS: 0
SNORES LOUDLY: 0
FATIGUE: 0
DYSPNEA ON EXERTION: 1
HEMOPTYSIS: 0
POLYPHAGIA: 0
NIGHT SWEATS: 0
ARTHRALGIAS: 1
CHILLS: 0
COUGH: 0
JOINT SWELLING: 1
WEIGHT GAIN: 0
INCREASED ENERGY: 0
MUSCLE WEAKNESS: 0
COUGH DISTURBING SLEEP: 0
ALTERED TEMPERATURE REGULATION: 0
SHORTNESS OF BREATH: 0

## 2020-08-24 ASSESSMENT — MIFFLIN-ST. JEOR: SCORE: 1224.47

## 2020-08-24 ASSESSMENT — PAIN SCALES - GENERAL: PAINLEVEL: NO PAIN (0)

## 2020-08-24 NOTE — NURSING NOTE
Chief Complaint   Patient presents with     Follow Up     HF with severe MR- Pt and daughter wanting to discuss surgical options       Vitals were taken and medications were reconciled.     Eve Alejo  2:24 PM

## 2020-08-24 NOTE — PATIENT INSTRUCTIONS
Patient Instructions:  It was a pleasure to see you in the cardiology clinic today.      Note the new or changes to your medications: Increase Toprol to 100mg Daily    Call Imaging Scheduling [phone] Opt 1 to schedule an Echocardiogram.    Please follow up with Dr. Emerson for the MitraClip. His office will call you to set up an appointment. If you haven't heard from them in 2 weeks, please let us know.     If you have any questions, call  Agustina Verma RN, at [phone].  Press Option #1 for the [facility], and then press Option #4  We are encouraging the use of Ascenzt to communicate with your HealthCare Provider  If you have an urgent need after hours (8:00 am to 4:30 pm) please call [phone] and ask for the cardiology fellow on call.

## 2020-08-24 NOTE — LETTER
8/24/2020      RE: Damon rGegory  [address] [place] [address]       Dear Colleague,    Thank you for the opportunity to participate in the care of your patient, Damon Gregory, at the [facility] HEART [facility] acute [facility]. Please see a copy of my visit note below.    HPI: 64 yo female accompanied by daughter who serves as  in pt with severe MR and EF 40% in past.  Pt has previously refused surgical or percutaneous repair and does not complain of fatigue, or SOB but does have LOMELI.  Denies bipedal edema, no fevers, chills, NS.  Reports complaince of meds.      PAST MEDICAL HISTORY:  Past Medical History:   Diagnosis Date     Asthma      Cardiomyopathy, unspecified (H) / EF40% 10/5/2015     CHF (congestive heart failure) (H)      Hyperlipidemia      Kidney stone 10/2013     Lipoma 2000     Menopause      Mitral valve insufficiency 10/5/2015     Vitamin D deficiency        FAMILY HISTORY:  No family history on file.    SOCIAL HISTORY:  Social History     Socioeconomic History     Marital status:      Spouse name: None     Number of children: None     Years of education: None     Highest education level: None   Occupational History     None   Social Needs     Financial resource strain: None     Food insecurity     Worry: None     Inability: None     Transportation needs     Medical: None     Non-medical: None   Tobacco Use     Smoking status: Never Smoker     Smokeless tobacco: Never Used   Substance and Sexual Activity     Alcohol use: No     Drug use: No     Sexual activity: Yes     Partners: Male     Birth control/protection: None   Lifestyle     Physical activity     Days per week: None     Minutes per session: None     Stress: None   Relationships     Social connections     Talks on phone: None     Gets together: None     Attends [employer] service: None     Active member of club or organization: None     Attends meetings of clubs or organizations: None      "Relationship status: None     Intimate partner violence     Fear of current or ex partner: None     Emotionally abused: None     Physically abused: None     Forced sexual activity: None   Other Topics Concern     Parent/sibling w/ CABG, MI or angioplasty before 65F 55M? No      Service Not Asked     Blood Transfusions Not Asked     Caffeine Concern Yes     Occupational Exposure Not Asked     Hobby Hazards Not Asked     Sleep Concern Not Asked     Stress Concern Not Asked     Weight Concern Not Asked     Special Diet Not Asked     Back Care Not Asked     Exercise Yes     Comment: walking     Bike Helmet Not Asked     Seat Belt Not Asked     Self-Exams Not Asked   Social History Narrative     None       CURRENT MEDICATIONS:  Current Outpatient Medications   Medication Sig Dispense Refill     aspirin 81 MG tablet Take 1 tablet (81 mg) by mouth daily 100 tablet 3     Cyanocobalamin (VITAMIN B 12 PO) Take 1,000 mcg by mouth daily       furosemide (LASIX) 20 MG tablet Take 1 tablet (20 mg) by mouth daily 90 tablet 3     losartan (COZAAR) 50 MG tablet TAKE 1 TABLET BY MOUTH EVERY DAY 90 tablet 1     metoprolol succinate ER (TOPROL-XL) 50 MG 24 hr tablet Take 1 tablet (50 mg) by mouth At Bedtime 90 tablet 0     UNABLE TO FIND daily MEDICATION NAME: Vitamin D with something for joints         ROS:   Constitutional: No fever, chills, or sweats. No weight gain/loss.   ENT: No visual disturbance, ear ache, epistaxis, sore throat.   Allergies/Immunologic: Negative.   Respiratory: No cough, hemoptysis.   Cardiovascular: As per HPI.   GI: No nausea, vomiting, hematemesis, melena, or hematochezia.   : No urinary frequency, dysuria, or hematuria.   Integument: Negative.   Psychiatric: Negative.   Neuro: Negative.   Endocrinology: Negative.   Musculoskeletal: Negative.    EXAM:  BP (!) 151/84 (BP Location: Right arm, Patient Position: Sitting, Cuff Size: Adult Regular)   Pulse 59   Ht 1.575 m (5' 2\")   Wt 72.1 kg (159 " lb)   LMP  (LMP Unknown)   SpO2 99%   BMI 29.08 kg/m    General: appears comfortable, alert and articulate  Head: normocephalic, atraumatic  Eyes: anicteric sclera, EOMI  No pedal edema    Labs:  CBC RESULTS:  Lab Results   Component Value Date    WBC 6.1 02/11/2019    RBC 4.15 02/11/2019    HGB 12.0 02/11/2019    HCT 37.0 02/11/2019    MCV 89 02/11/2019    MCH 28.9 02/11/2019    MCHC 32.4 02/11/2019    RDW 13.0 02/11/2019     02/11/2019       CMP RESULTS:  Lab Results   Component Value Date     06/09/2020    POTASSIUM 4.2 06/09/2020    CHLORIDE 109 06/09/2020    CO2 25 06/09/2020    ANIONGAP 6 06/09/2020    GLC 83 06/09/2020    BUN 16 06/09/2020    CR 0.72 06/09/2020    GFRESTIMATED 89 06/09/2020    GFRESTBLACK >90 06/09/2020    MIKE 8.5 06/09/2020    BILITOTAL 0.4 06/09/2020    ALBUMIN 3.5 06/09/2020    ALKPHOS 56 06/09/2020    ALT 25 06/09/2020    AST 14 06/09/2020        INR RESULTS:  No results found for: INR    No results found for: MAG  Lab Results   Component Value Date    NTBNPI 113 05/18/2018     Lab Results   Component Value Date    NTBNP 254 (H) 03/09/2016       Assessment and Plan:   Pt with severe MR and valvular cardiomyopathy.  Now agreeable to repair of valvular dz.  Will need TTE, increase toprol to 100 mg and follow BP.  Will refer to MitraClip clinic for CT and assessment of whether pt will benefit from closure.      Flash Mirza MD, PhD  Professor, Heart Failure and Cardiac Transplantation  [facility]  CC  Patient Care Team:  Aileen Pizano, APRN BETSY as PCP - General (Nurse Practitioner)  Flash Mirza MD as MD (Cardiology)  Sherrill George MD as Assigned PCP  SHERRILL GEORGE    Answers for HPI/ROS submitted by the patient on 8/24/2020   General Symptoms: Yes  Skin Symptoms: No  HENT Symptoms: No  EYE SYMPTOMS: No  HEART SYMPTOMS: No  LUNG SYMPTOMS: Yes  INTESTINAL SYMPTOMS: No  URINARY SYMPTOMS:  No  GYNECOLOGIC SYMPTOMS: No  BREAST SYMPTOMS: No  SKELETAL SYMPTOMS: Yes  BLOOD SYMPTOMS: No  NERVOUS SYSTEM SYMPTOMS: No  MENTAL HEALTH SYMPTOMS: No  Fever: No  Loss of appetite: No  Weight loss: No  Weight gain: No  Fatigue: No  Night sweats: No  Chills: No  Increased stress: No  Excessive hunger: No  Excessive thirst: No  Feeling hot or cold when others believe the temperature is normal: No  Loss of height: No  Post-operative complications: No  Surgical site pain: No  Hallucinations: No  Change in or Loss of Energy: No  Hyperactivity: No  Confusion: No  Cough: No  Sputum or phlegm: No  Coughing up blood: No  Difficulty breating or shortness of breath: No  Snoring: No  Wheezing: No  Difficulty breathing on exertion: Yes  Nighttime Cough: No  Difficulty breathing when lying flat: No  Back pain: No  Muscle aches: Yes  Neck pain: No  Swollen joints: Yes  Joint pain: Yes  Bone pain: Yes  Muscle cramps: No  Muscle weakness: No  Joint stiffness: Yes  Bone fracture: No      Please do not hesitate to contact me if you have any questions/concerns.     Sincerely,     Flash Mirza MD

## 2020-08-25 NOTE — NURSING NOTE
Cardiac Testing: Patient given instructions regarding  echocardiogram . Discussed purpose, preparation, procedure and when to expect results reported back to the patient. Patient demonstrated understanding of this information and agreed to call with further questions or concerns.  Labs: Patient was given results of the laboratory testing obtained today. Patient was instructed to return for the next laboratory testing with next appt. Patient demonstrated understanding of this information and agreed to call with further questions or concerns.   Medication Change: Patient was educated regarding prescribed medication change, including discussion of the indication, administration, side effects, and when to report to MD or RN. Patient demonstrated understanding of this information and agreed to call with further questions or concerns.  Pt was referred to Dr. Bill for MitraClip.  Patient stated she understood all health information given and agreed to call with further questions or concerns.  Agustina Verma RN

## 2020-09-09 ENCOUNTER — ANCILLARY PROCEDURE (OUTPATIENT)
Dept: CARDIOLOGY | Facility: CLINIC | Age: 65
End: 2020-09-09
Attending: INTERNAL MEDICINE
Payer: COMMERCIAL

## 2020-09-09 DIAGNOSIS — I34.0 MITRAL VALVE INSUFFICIENCY, UNSPECIFIED ETIOLOGY: ICD-10-CM

## 2020-09-09 DIAGNOSIS — I50.9 CHRONIC CONGESTIVE HEART FAILURE, UNSPECIFIED HEART FAILURE TYPE (H): ICD-10-CM

## 2020-09-15 DIAGNOSIS — I34.0 MITRAL VALVE INSUFFICIENCY, UNSPECIFIED ETIOLOGY: ICD-10-CM

## 2020-09-15 DIAGNOSIS — I50.9 CHRONIC CONGESTIVE HEART FAILURE (H): Primary | ICD-10-CM

## 2020-09-15 DIAGNOSIS — I34.0 MITRAL VALVE INSUFFICIENCY, UNSPECIFIED ETIOLOGY: Primary | ICD-10-CM

## 2020-09-15 NOTE — PROGRESS NOTES
Date: 9/15/2020    Time of Call: 1:18 PM     Diagnosis:  Severe mitral insufficiency     [ TORB ] Ordering provider: Ki Bill MD  Order:   6 MWT  EKG     Order received by: Sheila Quintanilla RN     Follow-up/additional notes:   Referral to Valve clinic by Dr. Jossue Mirza.  6MWT and EKG have been ordered as part of the work up and pre-clinic visit.  Pt will still need a surgeon evaluation, and MARGAUX, along with a possible Cath lab procedure.

## 2020-09-21 ENCOUNTER — TELEPHONE (OUTPATIENT)
Dept: CARDIOLOGY | Facility: CLINIC | Age: 65
End: 2020-09-21

## 2020-09-21 NOTE — TELEPHONE ENCOUNTER
Scheduled patient for 6 min joon lora valve clinic visit on 10/9/2020. Left vm with information and contact number if she has any questions.

## 2020-09-23 ENCOUNTER — TELEPHONE (OUTPATIENT)
Dept: CARDIOLOGY | Facility: CLINIC | Age: 65
End: 2020-09-23

## 2020-09-23 NOTE — TELEPHONE ENCOUNTER
Called Brandon( Daughter who called us to schedule this for her mom). To let her know that we have her scheduled for 10/9/2020 staring at 9:30 for 6 min walk and 10:30am with the valve team. Left my contact information to call me back and let me know she has received this message.

## 2020-10-09 ENCOUNTER — OFFICE VISIT (OUTPATIENT)
Dept: INTERPRETER SERVICES | Facility: CLINIC | Age: 65
End: 2020-10-09
Attending: INTERNAL MEDICINE
Payer: COMMERCIAL

## 2020-10-09 VITALS — BODY MASS INDEX: 28.9 KG/M2 | WEIGHT: 158 LBS

## 2020-10-09 DIAGNOSIS — I34.0 MITRAL VALVE INSUFFICIENCY, UNSPECIFIED ETIOLOGY: ICD-10-CM

## 2020-10-09 DIAGNOSIS — I50.9 CHRONIC CONGESTIVE HEART FAILURE (H): ICD-10-CM

## 2020-10-09 DIAGNOSIS — I51.89 OTHER ILL-DEFINED HEART DISEASES: ICD-10-CM

## 2020-10-09 DIAGNOSIS — I34.0 MITRAL VALVE INSUFFICIENCY, UNSPECIFIED ETIOLOGY: Primary | ICD-10-CM

## 2020-10-09 LAB
6 MIN WALK (FT): 960 FT
6 MIN WALK (M): 293 M
INTERPRETATION ECG - MUSE: NORMAL

## 2020-10-09 PROCEDURE — G0463 HOSPITAL OUTPT CLINIC VISIT: HCPCS | Mod: 25

## 2020-10-09 PROCEDURE — 99203 OFFICE O/P NEW LOW 30 MIN: CPT | Mod: GC | Performed by: INTERNAL MEDICINE

## 2020-10-09 PROCEDURE — 93005 ELECTROCARDIOGRAM TRACING: CPT

## 2020-10-09 PROCEDURE — 94618 PULMONARY STRESS TESTING: CPT | Performed by: INTERNAL MEDICINE

## 2020-10-09 PROCEDURE — 93010 ELECTROCARDIOGRAM REPORT: CPT | Performed by: INTERNAL MEDICINE

## 2020-10-09 ASSESSMENT — MIFFLIN-ST. JEOR: SCORE: 1235.81

## 2020-10-09 ASSESSMENT — PAIN SCALES - GENERAL: PAINLEVEL: NO PAIN (0)

## 2020-10-09 NOTE — PATIENT INSTRUCTIONS
You were seen today in the Cardiovascular Clinic at the [facility].      Cardiology Providers you saw during your visit:  Dr. Ki Bill MD    Recommendations:    MARGAUX (transesophageal echocardiogram)  Coronary angiogram and right heart catheterization      Pre-procedure Instructions:    Transesophageal Echocardiogram (MARGAUX)  1.  Plan on someone being able to drive you home after the procedure.  2.  Nothing to eat or drink 6 hours before the procedure.  It is ok to take your morning medications with a sip of water.  4.  Report to the [facility] Waiting room in the hospital 15 minutes before hand.  5.  Plan on approximately 2 hours in the hospital  6.  You will need COVID testing prior to your procedure.  You will be contacted by the COVID testing team to arrange this.    [facility], [place] [address]        (Coronary angiogram):  You will be scheduled for a Coronary Angiogram at the [facility], [address] [place] [address]. You are to check in at the [facility] Waiting Area.   Pre procedure instructions:  1. Please make arrangements to have a  as you will not be allowed to drive following the procedure.  2. Do not eat or drink after midnight the day of your procedure.  3. You may take your usual morning medications with a sip of water the morning of your procedure.  4. Take a 81 mg aspirin the day before and the day of your procedure.  5. Make arrangements to have someone stay with you the night after your procedure.  6.  You will need COVID testing prior to your procedure.  You will be contacted by the COVID testing team to arrange this.      After all of your imaging/testing is completed, your case will be discussed at our Valve conference.  After this conference, the recommendations will be discussed with you, along with the appropriate follow up appointments.        For questions, you may  call the following numbers:    1.  [phone]:  Brooke Structural Heart Lead CMA  2.  Nursing questions: Lynsey Quintanilla RN:  [phone]  3.  For emergencies call 911.    It was a pleasure to see you in clinic.  If you have any questions at all, please feel free to give me a call.      Lynsey Quintanilla RN  Structural Heart Care Coordinator   TAVR and MitraClip Programs  [facility]  Office: [phone]    [facility]  [address]  Cardiology [address]  [place] [address]

## 2020-10-09 NOTE — PROGRESS NOTES
STRUCTURAL INTERVENTIONAL CARDIOLOGY CLINIC INITIAL CONSULTATION    PRIMARY CARDIOLOGIST: Dr. Mirza      PERTINENT CLINICAL HISTORY:     Mrs. Damon Gregory is a very pleasant 65 year old female with severe MR and reduced EF at 40% is referred to our clinic for evaluation and consideration for potential transcatheter Mitraclip placement.     She is a Somalian Czech speaking lady.  We used an  through website.  The patient is with her daughter who can speak English as well.    Initially she presented to [facility] emergency department in 2016 with shortness of breath.  She was found to have congestive heart failure with reduced EF at 40%.  Echocardiogram showed severe mitral regurgitation with global hypokinesis.  Coronary angiography and potential intervention to mitral regurgitation were discussed at the time however the patient did not want to proceed them.  Then she established care at [facility].  Now the patient states that she is willing to proceed evaluation and is referred to structural interventional clinic by Dr Flash Mirza.    She does not have shortness of breath.  She does chores at home and does not have difficulty climbing up stairs.  She denies PND, leg edema, or chest pain.  She does not do any specific exercises.  Non-smoker.       PAST MEDICAL HISTORY:     Past Medical History:   Diagnosis Date     Asthma      Cardiomyopathy, unspecified (H) / EF40% 10/5/2015     CHF (congestive heart failure) (H)      Hyperlipidemia      Kidney stone 10/2013     Lipoma 2000     Menopause      Mitral valve insufficiency 10/5/2015     Vitamin D deficiency         PAST SURGICAL HISTORY:   No past surgical history on file.     CURRENT MEDICATIONS:     Current Outpatient Medications   Medication Sig Dispense Refill     aspirin 81 MG tablet Take 1 tablet (81 mg) by mouth daily 100 tablet 3     Cyanocobalamin (VITAMIN B 12 PO) Take 1,000 mcg by mouth daily       furosemide  (LASIX) 20 MG tablet Take 1 tablet (20 mg) by mouth daily 90 tablet 3     losartan (COZAAR) 50 MG tablet TAKE 1 TABLET BY MOUTH EVERY DAY 90 tablet 1     metoprolol succinate ER (TOPROL-XL) 50 MG 24 hr tablet Take 2 tablets (100 mg) by mouth At Bedtime 90 tablet 0     UNABLE TO FIND daily MEDICATION NAME: Vitamin D with something for joints          ALLERGIES:   No Known Allergies     FAMILY HISTORY:   No family history on file.     SOCIAL HISTORY:     Social History     Socioeconomic History     Marital status:      Spouse name: None     Number of children: None     Years of education: None     Highest education level: None   Occupational History     None   Social Needs     Financial resource strain: None     Food insecurity     Worry: None     Inability: None     Transportation needs     Medical: None     Non-medical: None   Tobacco Use     Smoking status: Never Smoker     Smokeless tobacco: Never Used   Substance and Sexual Activity     Alcohol use: No     Drug use: No     Sexual activity: Yes     Partners: Male     Birth control/protection: None   Lifestyle     Physical activity     Days per week: None     Minutes per session: None     Stress: None   Relationships     Social connections     Talks on phone: None     Gets together: None     Attends [employer] service: None     Active member of club or organization: None     Attends meetings of clubs or organizations: None     Relationship status: None     Intimate partner violence     Fear of current or ex partner: None     Emotionally abused: None     Physically abused: None     Forced sexual activity: None   Other Topics Concern     Parent/sibling w/ CABG, MI or angioplasty before 65F 55M? No      Service Not Asked     Blood Transfusions Not Asked     Caffeine Concern Yes     Occupational Exposure Not Asked     Hobby Hazards Not Asked     Sleep Concern Not Asked     Stress Concern Not Asked     Weight Concern Not Asked     Special Diet Not Asked      Back Care Not Asked     Exercise Yes     Comment: walking     Bike Helmet Not Asked     Seat Belt Not Asked     Self-Exams Not Asked   Social History Narrative     None        REVIEW OF SYSTEMS:     Constitutional: No fevers or chills  Skin: No new rash or itching  Eyes: No acute change in vision  Ears/Nose/Throat: No purulent rhinorrhea, new hearing loss, or new vertigo  Respiratory: No cough or hemoptysis  Cardiovascular: See HPI  Gastrointestinal: No change in appetite, vomiting, hematemesis or diarrhea  Genitourinary: No dysuria or hematuria  Musculoskeletal: No new back pain, neck pain or muscle pain  Neurologic: No new headaches, focal weakness or behavior changes  Psychiatric: No hallucinations, excessive alcohol consumption or illegal drug usage  Hematologic/Lymphatic/Immunologic: No bleeding, chills, fever, night sweats or weight loss  Endocrine: No new cold intolerance, heat intolerance, polyphagia, polydipsia or polyuria      PHYSICAL EXAMINATION:     Wt 71.7 kg (158 lb)   LMP  (LMP Unknown)   BMI 28.90 kg/m      GENERAL: No acute distress.  HEENT: EOMI. Sclerae white, not injected. Nares clear. Pharynx without erythema or exudate.   Neck: No adenopathy. No thyromegaly. Symmetrical.   Heart: Regular rate and rhythm.  Systolic murmur 3/6.    Lungs: Clear to auscultation. No ronchi, wheezes, rales.   Abdomen: Soft, nontender, nondistended. Bowel sounds present.  Extremities: No clubbing, cyanosis, or edema.   Neurologic: Alert and oriented to person/place/time, normal speech and affect. No focal deficits.  Skin: No petechiae, purpura or rash.     LABORATORY DATA:     LIPID RESULTS:  Lab Results   Component Value Date    CHOL 192 06/09/2020    HDL 46 (L) 06/09/2020     (H) 06/09/2020    TRIG 90 06/09/2020       LIVER ENZYME RESULTS:  Lab Results   Component Value Date    AST 14 06/09/2020    ALT 25 06/09/2020       CBC RESULTS:  Lab Results   Component Value Date    WBC 6.1 02/11/2019    RBC  4.15 02/11/2019    HGB 12.0 02/11/2019    HCT 37.0 02/11/2019    MCV 89 02/11/2019    MCH 28.9 02/11/2019    MCHC 32.4 02/11/2019    RDW 13.0 02/11/2019     02/11/2019       BMP RESULTS:  Lab Results   Component Value Date     06/09/2020    POTASSIUM 4.2 06/09/2020    CHLORIDE 109 06/09/2020    CO2 25 06/09/2020    ANIONGAP 6 06/09/2020    GLC 83 06/09/2020    BUN 16 06/09/2020    CR 0.72 06/09/2020    GFRESTIMATED 89 06/09/2020    GFRESTBLACK >90 06/09/2020    MIKE 8.5 06/09/2020        A1C RESULTS:  Lab Results   Component Value Date    A1C 5.5 06/09/2020       INR RESULTS:  No results found for: INR       PROCEDURES & FURTHER ASSESSMENTS:     ECG: Normal sinus rhythm.    Echocardiogram:    CT TAVR:    Coronary Angiogram and Right Heart Catheterization:    PFTs:    Carotid Ultrasound:         CLINICAL IMPRESSION:     Damon Gregory is a 65 year old lady with reduced LVEF at 40% with moderate to severe mitral regurgitation who is referred to interventional structural clinic.  The patient has minimal symptoms however she has not done any evaluation of reduced EF.  From personal review of trans-thoracic echocardiogram, posterior leaflet is restricted and moderate mitral regurgitation with LV dilatation.  It could be difficult to be clipped however we will obtain MARGAUX and decide.  We would proceed coronary angiography and right heart cath.         Patient was evaluated in clinic with Ki Bill MD of interventional cardiology.    Yoav Lezama MD  Interventional Cardiology Fellow [id number]        CC  Patient Care Team:  Chris Pizano, DESIREE MEYER as PCP - General (Nurse Practitioner)  Flash Mirza MD as MD (Cardiology)  Molly George MD as Assigned PCP  CHRIS PIZANO    Patient seen and examined with Cardiovascular fellow and agree with the assessment and plan described above.     Ki Bill M.D.  Interventional Cardiology  [facility]  [place]

## 2020-10-12 LAB — FIO2-PRE: 21 %

## 2020-10-20 DIAGNOSIS — Z11.59 ENCOUNTER FOR SCREENING FOR OTHER VIRAL DISEASES: Primary | ICD-10-CM

## 2020-10-20 PROBLEM — I51.89 OTHER ILL-DEFINED HEART DISEASES: Status: ACTIVE | Noted: 2020-10-20

## 2020-10-20 PROBLEM — I34.0 MITRAL VALVE INSUFFICIENCY, UNSPECIFIED ETIOLOGY: Status: ACTIVE | Noted: 2020-10-20

## 2020-10-23 ENCOUNTER — CARE COORDINATION (OUTPATIENT)
Dept: CARDIOLOGY | Facility: CLINIC | Age: 65
End: 2020-10-23

## 2020-10-23 NOTE — PROGRESS NOTES
"Daughter Brandon states that her mom is adamant that she does not want COVID 19 testing prior to the MARGAUX, CORS/RHC/LHC that is scheduled on Nov. 15.  Brandon stated that her mom was willing for a saliva test. The question of COVID testing by saliva was checked with  [facility] lab and Hospital policy.  The answer to saliva testing was \"Not accepted\".  This information was shared with Brandon.  Brandon said that she would speak with her mother again about this.    " room air

## 2020-11-02 ENCOUNTER — NURSE TRIAGE (OUTPATIENT)
Dept: NURSING | Facility: CLINIC | Age: 65
End: 2020-11-02

## 2020-11-02 ENCOUNTER — VIRTUAL VISIT (OUTPATIENT)
Dept: FAMILY MEDICINE | Facility: CLINIC | Age: 65
End: 2020-11-02
Payer: COMMERCIAL

## 2020-11-02 DIAGNOSIS — I51.89 OTHER ILL-DEFINED HEART DISEASES: ICD-10-CM

## 2020-11-02 DIAGNOSIS — Z11.59 ENCOUNTER FOR SCREENING FOR OTHER VIRAL DISEASES: ICD-10-CM

## 2020-11-02 DIAGNOSIS — I34.0 MITRAL VALVE INSUFFICIENCY, UNSPECIFIED ETIOLOGY: Primary | ICD-10-CM

## 2020-11-02 PROCEDURE — U0003 INFECTIOUS AGENT DETECTION BY NUCLEIC ACID (DNA OR RNA); SEVERE ACUTE RESPIRATORY SYNDROME CORONAVIRUS 2 (SARS-COV-2) (CORONAVIRUS DISEASE [COVID-19]), AMPLIFIED PROBE TECHNIQUE, MAKING USE OF HIGH THROUGHPUT TECHNOLOGIES AS DESCRIBED BY CMS-2020-01-R: HCPCS | Performed by: INTERNAL MEDICINE

## 2020-11-02 PROCEDURE — 99214 OFFICE O/P EST MOD 30 MIN: CPT | Mod: 95 | Performed by: FAMILY MEDICINE

## 2020-11-02 RX ORDER — DIAZEPAM 5 MG
TABLET ORAL
Qty: 1 TABLET | Refills: 0 | Status: ON HOLD | OUTPATIENT
Start: 2020-11-02 | End: 2020-11-05

## 2020-11-02 NOTE — TELEPHONE ENCOUNTER
Yes, should do E-visit or virtual visit for this.  Dr. Sybil Casillas MD / [facility]

## 2020-11-02 NOTE — TELEPHONE ENCOUNTER
Aielen/Covering Providers-Please review request for anxiety medication.  Writer would recommend virtual visit to address symptoms and medication request.    Thank you!  LUTHER ParkinsonN, RN

## 2020-11-02 NOTE — TELEPHONE ENCOUNTER
Daughter states patient has curbside Covid 19 testing in [place] this date at 1:20PM.  Patient is very anxious and her cardiologist team suggested PCP may be able to prescribe something for her.      Aaliyah Toribio RN  [facility] Triage Nurse Advisor    Please close encounter when completed.

## 2020-11-02 NOTE — PROGRESS NOTES
"Damon Gregory is a 65 year old female who is being evaluated via a billable telephone visit.      The patient has been notified of following:     \"This telephone visit will be conducted via a call between you and your physician/provider. We have found that certain health care needs can be provided without the need for a physical exam.  This service lets us provide the care you need with a short phone conversation.  If a prescription is necessary we can send it directly to your pharmacy.  If lab work is needed we can place an order for that and you can then stop by our lab to have the test done at a later time.    Telephone visits are billed at different rates depending on your insurance coverage. During this emergency period, for some insurers they may be billed the same as an in-person visit.  Please reach out to your insurance provider with any questions.    If during the course of the call the physician/provider feels a telephone visit is not appropriate, you will not be charged for this service.\"    Patient has given verbal consent for Telephone visit?  Yes    What phone number would you like to be contacted at? [phone]    How would you like to obtain your AVS? Dannielle    Subjective     Damon Gregory is a 65 year old female who presents via phone visit today for the following health issues:    HPI        She previously had an influenza test and patient didn't do well. She has a COVID test scheduled for today and pt requested anxiety medication. She won't cooperate and will get really tense. She hasn't slept one month due to fears of the COVID test.       Review of Systems   Constitutional, HEENT, cardiovascular, pulmonary, gi and gu systems are negative, except as otherwise noted.       Objective          Vitals:  No vitals were obtained today due to virtual visit.    healthy, alert and no distress  PSYCH: Alert and oriented times 3; coherent speech, normal   rate and volume, able to articulate logical " thoughts, able   to abstract reason, no tangential thoughts, no hallucinations   or delusions  Her affect is normal  RESP: No cough, no audible wheezing, able to talk in full sentences  Remainder of exam unable to be completed due to telephone visits    No results found for this or any previous visit (from the past 24 hour(s)).        Assessment/Plan:    1. Mitral valve insufficiency, unspecified etiology  - Reviewed [place]  - no concerning activity  - Anxiety related to upcoming COVID testing due to previously negative experience with influenza swab. I discussed with daughter that she acn take Valium 2.5-5 mg 30-60 minutes before procedure. Cautioned about sedating side effects and daughter will drive patient to and from appointment.   - diazepam (VALIUM) 5 MG tablet; Valium 2.5 to 5 mg, 30 to 60 minutes before the procedure  Dispense: 1 tablet; Refill: 0    2. Other ill-defined heart diseases  - diazepam (VALIUM) 5 MG tablet; Valium 2.5 to 5 mg, 30 to 60 minutes before the procedure  Dispense: 1 tablet; Refill: 0      Phone call duration:  6 minutes

## 2020-11-02 NOTE — TELEPHONE ENCOUNTER
Consent to communicate with Brandon on file.    Writer called Brandon and explained need for virtual visit with provider to address anxiety and discuss medication options.    Brandon verbalized understanding and stated:  1. Patient very anxious over COVID-19 testing appt today  2. Will go over to patient's home to explain phone visit will be scheduled to address anxiety and mediation request    Branodn will be present for phone visit.    Visit scheduled with Dr. Gregory today at 1120.    LUTHER ParkinsonN, RN

## 2020-11-03 LAB
SARS-COV-2 RNA SPEC QL NAA+PROBE: NOT DETECTED
SPECIMEN SOURCE: NORMAL

## 2020-11-05 ENCOUNTER — APPOINTMENT (OUTPATIENT)
Dept: LAB | Facility: CLINIC | Age: 65
End: 2020-11-05
Attending: INTERNAL MEDICINE
Payer: COMMERCIAL

## 2020-11-05 ENCOUNTER — HOSPITAL ENCOUNTER (OUTPATIENT)
Dept: CARDIOLOGY | Facility: CLINIC | Age: 65
End: 2020-11-05
Attending: INTERNAL MEDICINE
Payer: COMMERCIAL

## 2020-11-05 ENCOUNTER — APPOINTMENT (OUTPATIENT)
Dept: MEDSURG UNIT | Facility: CLINIC | Age: 65
End: 2020-11-05
Attending: INTERNAL MEDICINE
Payer: COMMERCIAL

## 2020-11-05 ENCOUNTER — HOSPITAL ENCOUNTER (OUTPATIENT)
Facility: CLINIC | Age: 65
Discharge: HOME | End: 2020-11-05
Attending: INTERNAL MEDICINE | Admitting: INTERNAL MEDICINE
Payer: COMMERCIAL

## 2020-11-05 VITALS
WEIGHT: 159 LBS | RESPIRATION RATE: 18 BRPM | SYSTOLIC BLOOD PRESSURE: 121 MMHG | OXYGEN SATURATION: 100 % | HEIGHT: 62 IN | TEMPERATURE: 98 F | DIASTOLIC BLOOD PRESSURE: 59 MMHG | BODY MASS INDEX: 29.26 KG/M2 | HEART RATE: 72 BPM

## 2020-11-05 VITALS
RESPIRATION RATE: 18 BRPM | DIASTOLIC BLOOD PRESSURE: 51 MMHG | OXYGEN SATURATION: 98 % | SYSTOLIC BLOOD PRESSURE: 111 MMHG | HEART RATE: 56 BPM

## 2020-11-05 DIAGNOSIS — I34.0 MITRAL VALVE INSUFFICIENCY, UNSPECIFIED ETIOLOGY: ICD-10-CM

## 2020-11-05 DIAGNOSIS — I51.89 OTHER ILL-DEFINED HEART DISEASES: ICD-10-CM

## 2020-11-05 PROBLEM — Z98.890 STATUS POST CORONARY ANGIOGRAM: Status: ACTIVE | Noted: 2020-11-05

## 2020-11-05 LAB
ANION GAP SERPL CALCULATED.3IONS-SCNC: 5 MMOL/L (ref 3–14)
BUN SERPL-MCNC: 16 MG/DL (ref 7–30)
CALCIUM SERPL-MCNC: 9 MG/DL (ref 8.5–10.1)
CHLORIDE SERPL-SCNC: 109 MMOL/L (ref 94–109)
CO2 SERPL-SCNC: 26 MMOL/L (ref 20–32)
CREAT SERPL-MCNC: 0.68 MG/DL (ref 0.52–1.04)
ERYTHROCYTE [DISTWIDTH] IN BLOOD BY AUTOMATED COUNT: 12.7 % (ref 10–15)
GFR SERPL CREATININE-BSD FRML MDRD: >90 ML/MIN/{1.73_M2}
GLUCOSE SERPL-MCNC: 125 MG/DL (ref 70–99)
HCT VFR BLD AUTO: 35.4 % (ref 35–47)
HGB BLD-MCNC: 11.5 G/DL (ref 11.7–15.7)
MCH RBC QN AUTO: 28.7 PG (ref 26.5–33)
MCHC RBC AUTO-ENTMCNC: 32.5 G/DL (ref 31.5–36.5)
MCV RBC AUTO: 88 FL (ref 78–100)
PLATELET # BLD AUTO: 212 10E9/L (ref 150–450)
POTASSIUM SERPL-SCNC: 4.2 MMOL/L (ref 3.4–5.3)
RBC # BLD AUTO: 4.01 10E12/L (ref 3.8–5.2)
SODIUM SERPL-SCNC: 140 MMOL/L (ref 133–144)
WBC # BLD AUTO: 4.9 10E9/L (ref 4–11)

## 2020-11-05 PROCEDURE — 93010 ELECTROCARDIOGRAM REPORT: CPT | Performed by: INTERNAL MEDICINE

## 2020-11-05 PROCEDURE — 93312 ECHO TRANSESOPHAGEAL: CPT | Mod: 26 | Performed by: INTERNAL MEDICINE

## 2020-11-05 PROCEDURE — 76376 3D RENDER W/INTRP POSTPROCES: CPT | Mod: 26 | Performed by: INTERNAL MEDICINE

## 2020-11-05 PROCEDURE — 250N000009 HC RX 250: Performed by: INTERNAL MEDICINE

## 2020-11-05 PROCEDURE — 99152 MOD SED SAME PHYS/QHP 5/>YRS: CPT | Mod: 59 | Performed by: INTERNAL MEDICINE

## 2020-11-05 PROCEDURE — 99152 MOD SED SAME PHYS/QHP 5/>YRS: CPT | Performed by: INTERNAL MEDICINE

## 2020-11-05 PROCEDURE — 93456 R HRT CORONARY ARTERY ANGIO: CPT | Performed by: INTERNAL MEDICINE

## 2020-11-05 PROCEDURE — C1894 INTRO/SHEATH, NON-LASER: HCPCS | Performed by: INTERNAL MEDICINE

## 2020-11-05 PROCEDURE — 99153 MOD SED SAME PHYS/QHP EA: CPT | Performed by: INTERNAL MEDICINE

## 2020-11-05 PROCEDURE — 250N000013 HC RX MED GY IP 250 OP 250 PS 637: Performed by: INTERNAL MEDICINE

## 2020-11-05 PROCEDURE — 999N000054 HC STATISTIC EKG NON-CHARGEABLE

## 2020-11-05 PROCEDURE — 93325 DOPPLER ECHO COLOR FLOW MAPG: CPT | Mod: 26 | Performed by: INTERNAL MEDICINE

## 2020-11-05 PROCEDURE — 80048 BASIC METABOLIC PNL TOTAL CA: CPT | Performed by: INTERNAL MEDICINE

## 2020-11-05 PROCEDURE — 93325 DOPPLER ECHO COLOR FLOW MAPG: CPT

## 2020-11-05 PROCEDURE — 85027 COMPLETE CBC AUTOMATED: CPT | Performed by: INTERNAL MEDICINE

## 2020-11-05 PROCEDURE — 272N000001 HC OR GENERAL SUPPLY STERILE: Performed by: INTERNAL MEDICINE

## 2020-11-05 PROCEDURE — 93320 DOPPLER ECHO COMPLETE: CPT | Mod: 26 | Performed by: INTERNAL MEDICINE

## 2020-11-05 PROCEDURE — 36415 COLL VENOUS BLD VENIPUNCTURE: CPT | Performed by: INTERNAL MEDICINE

## 2020-11-05 PROCEDURE — 250N000011 HC RX IP 250 OP 636: Performed by: INTERNAL MEDICINE

## 2020-11-05 PROCEDURE — C1887 CATHETER, GUIDING: HCPCS | Performed by: INTERNAL MEDICINE

## 2020-11-05 PROCEDURE — 76376 3D RENDER W/INTRP POSTPROCES: CPT

## 2020-11-05 PROCEDURE — 999N000142 HC STATISTIC PROCEDURE PREP ONLY

## 2020-11-05 PROCEDURE — 258N000003 HC RX IP 258 OP 636: Performed by: INTERNAL MEDICINE

## 2020-11-05 PROCEDURE — 93456 R HRT CORONARY ARTERY ANGIO: CPT | Mod: 26 | Performed by: INTERNAL MEDICINE

## 2020-11-05 RX ORDER — ONDANSETRON 2 MG/ML
4 INJECTION INTRAMUSCULAR; INTRAVENOUS ONCE
Status: COMPLETED | OUTPATIENT
Start: 2020-11-05 | End: 2020-11-05

## 2020-11-05 RX ORDER — FENTANYL CITRATE 50 UG/ML
50 INJECTION, SOLUTION INTRAMUSCULAR; INTRAVENOUS ONCE
Status: COMPLETED | OUTPATIENT
Start: 2020-11-05 | End: 2020-11-05

## 2020-11-05 RX ORDER — SODIUM CHLORIDE 9 MG/ML
INJECTION, SOLUTION INTRAVENOUS CONTINUOUS
Status: DISCONTINUED | OUTPATIENT
Start: 2020-11-05 | End: 2020-11-05 | Stop reason: HOSPADM

## 2020-11-05 RX ORDER — LIDOCAINE 40 MG/G
CREAM TOPICAL
Status: DISCONTINUED | OUTPATIENT
Start: 2020-11-05 | End: 2020-11-05 | Stop reason: HOSPADM

## 2020-11-05 RX ORDER — FLUMAZENIL 0.1 MG/ML
0.2 INJECTION, SOLUTION INTRAVENOUS
Status: DISCONTINUED | OUTPATIENT
Start: 2020-11-05 | End: 2020-11-05 | Stop reason: HOSPADM

## 2020-11-05 RX ORDER — FENTANYL CITRATE 50 UG/ML
25-50 INJECTION, SOLUTION INTRAMUSCULAR; INTRAVENOUS
Status: ACTIVE | OUTPATIENT
Start: 2020-11-05 | End: 2020-11-05

## 2020-11-05 RX ORDER — ATROPINE SULFATE 0.1 MG/ML
0.5 INJECTION INTRAVENOUS
Status: DISCONTINUED | OUTPATIENT
Start: 2020-11-05 | End: 2020-11-05 | Stop reason: HOSPADM

## 2020-11-05 RX ORDER — FENTANYL CITRATE 50 UG/ML
INJECTION, SOLUTION INTRAMUSCULAR; INTRAVENOUS
Status: DISCONTINUED | OUTPATIENT
Start: 2020-11-05 | End: 2020-11-05 | Stop reason: HOSPADM

## 2020-11-05 RX ORDER — SODIUM CHLORIDE 9 MG/ML
INJECTION, SOLUTION INTRAVENOUS CONTINUOUS PRN
Status: DISCONTINUED | OUTPATIENT
Start: 2020-11-05 | End: 2020-11-06 | Stop reason: HOSPADM

## 2020-11-05 RX ORDER — IOPAMIDOL 755 MG/ML
INJECTION, SOLUTION INTRAVASCULAR
Status: DISCONTINUED | OUTPATIENT
Start: 2020-11-05 | End: 2020-11-05 | Stop reason: HOSPADM

## 2020-11-05 RX ORDER — NICARDIPINE HYDROCHLORIDE 2.5 MG/ML
INJECTION INTRAVENOUS
Status: DISCONTINUED | OUTPATIENT
Start: 2020-11-05 | End: 2020-11-05 | Stop reason: HOSPADM

## 2020-11-05 RX ORDER — FLUMAZENIL 0.1 MG/ML
0.2 INJECTION, SOLUTION INTRAVENOUS
Status: DISCONTINUED | OUTPATIENT
Start: 2020-11-05 | End: 2020-11-06 | Stop reason: HOSPADM

## 2020-11-05 RX ORDER — POTASSIUM CHLORIDE 750 MG/1
20 TABLET, EXTENDED RELEASE ORAL
Status: DISCONTINUED | OUTPATIENT
Start: 2020-11-05 | End: 2020-11-05 | Stop reason: HOSPADM

## 2020-11-05 RX ORDER — POTASSIUM CHLORIDE 750 MG/1
40 TABLET, EXTENDED RELEASE ORAL
Status: DISCONTINUED | OUTPATIENT
Start: 2020-11-05 | End: 2020-11-05 | Stop reason: HOSPADM

## 2020-11-05 RX ORDER — ACYCLOVIR 200 MG/1
9.5 CAPSULE ORAL
Status: DISCONTINUED | OUTPATIENT
Start: 2020-11-05 | End: 2020-11-06 | Stop reason: HOSPADM

## 2020-11-05 RX ORDER — NALOXONE HYDROCHLORIDE 0.4 MG/ML
.1-.4 INJECTION, SOLUTION INTRAMUSCULAR; INTRAVENOUS; SUBCUTANEOUS
Status: DISCONTINUED | OUTPATIENT
Start: 2020-11-05 | End: 2020-11-06 | Stop reason: HOSPADM

## 2020-11-05 RX ORDER — LIDOCAINE 40 MG/G
CREAM TOPICAL
Status: DISCONTINUED | OUTPATIENT
Start: 2020-11-05 | End: 2020-11-06 | Stop reason: HOSPADM

## 2020-11-05 RX ORDER — FENTANYL CITRATE 50 UG/ML
25 INJECTION, SOLUTION INTRAMUSCULAR; INTRAVENOUS
Status: DISCONTINUED | OUTPATIENT
Start: 2020-11-05 | End: 2020-11-06 | Stop reason: HOSPADM

## 2020-11-05 RX ORDER — LIDOCAINE HYDROCHLORIDE 20 MG/ML
15 SOLUTION OROPHARYNGEAL ONCE
Status: COMPLETED | OUTPATIENT
Start: 2020-11-05 | End: 2020-11-05

## 2020-11-05 RX ORDER — HEPARIN SODIUM 1000 [USP'U]/ML
INJECTION, SOLUTION INTRAVENOUS; SUBCUTANEOUS
Status: DISCONTINUED | OUTPATIENT
Start: 2020-11-05 | End: 2020-11-05 | Stop reason: HOSPADM

## 2020-11-05 RX ORDER — NITROGLYCERIN 5 MG/ML
VIAL (ML) INTRAVENOUS
Status: DISCONTINUED | OUTPATIENT
Start: 2020-11-05 | End: 2020-11-05 | Stop reason: HOSPADM

## 2020-11-05 RX ORDER — NALOXONE HYDROCHLORIDE 0.4 MG/ML
.2-.4 INJECTION, SOLUTION INTRAMUSCULAR; INTRAVENOUS; SUBCUTANEOUS
Status: DISCONTINUED | OUTPATIENT
Start: 2020-11-05 | End: 2020-11-05 | Stop reason: HOSPADM

## 2020-11-05 RX ORDER — NALOXONE HYDROCHLORIDE 0.4 MG/ML
.1-.4 INJECTION, SOLUTION INTRAMUSCULAR; INTRAVENOUS; SUBCUTANEOUS
Status: DISCONTINUED | OUTPATIENT
Start: 2020-11-05 | End: 2020-11-05 | Stop reason: HOSPADM

## 2020-11-05 RX ADMIN — FENTANYL CITRATE 50 MCG: 50 INJECTION, SOLUTION INTRAMUSCULAR; INTRAVENOUS at 09:02

## 2020-11-05 RX ADMIN — ONDANSETRON 4 MG: 2 INJECTION INTRAMUSCULAR; INTRAVENOUS at 08:48

## 2020-11-05 RX ADMIN — LIDOCAINE HYDROCHLORIDE 15 ML: 20 SOLUTION ORAL; TOPICAL at 08:43

## 2020-11-05 RX ADMIN — MIDAZOLAM 1 MG: 1 INJECTION INTRAMUSCULAR; INTRAVENOUS at 08:58

## 2020-11-05 RX ADMIN — TOPICAL ANESTHETIC 0.5 ML: 200 SPRAY DENTAL; PERIODONTAL at 08:43

## 2020-11-05 RX ADMIN — ASPIRIN 325 MG ORAL TABLET 325 MG: 325 PILL ORAL at 12:26

## 2020-11-05 RX ADMIN — FENTANYL CITRATE 50 MCG: 50 INJECTION, SOLUTION INTRAMUSCULAR; INTRAVENOUS at 08:58

## 2020-11-05 RX ADMIN — SODIUM CHLORIDE: 9 INJECTION, SOLUTION INTRAVENOUS at 12:26

## 2020-11-05 RX ADMIN — MIDAZOLAM 1 MG: 1 INJECTION INTRAMUSCULAR; INTRAVENOUS at 09:02

## 2020-11-05 ASSESSMENT — MIFFLIN-ST. JEOR: SCORE: 1224.47

## 2020-11-05 NOTE — PRE-PROCEDURE
GENERAL PRE-PROCEDURE:   Procedure:  Transesophageal Echocardiogram  Date/Time:  11/5/2020 8:40 AM    Verbal consent obtained?: Yes    Written consent obtained?: Yes    Risks and benefits: Risks, benefits and alternatives were discussed    Consent given by:  Patient  Patient states understanding of procedure being performed: Yes    Patient's understanding of procedure matches consent: Yes    Procedure consent matches procedure scheduled: Yes    Expected level of sedation:  Moderate  Appropriately NPO:  Yes  ASA Class:  Class 2- mild systemic disease, no acute problems, no functional limitations  Mallampati  :  Grade 2- soft palate, base of uvula, tonsillar pillars, and portion of posterior pharyngeal wall visible  Lungs:  Lungs clear with good breath sounds bilaterally  Heart:  Normal heart sounds and rate and systolic murmur  History & Physical reviewed:  History and physical reviewed and no updates needed  Statement of review:  I have reviewed the lab findings, diagnostic data, medications, and the plan for sedation

## 2020-11-05 NOTE — PROGRESS NOTES
Pt arrived in ECHO department  for scheduled MARGAUX.   Procedure explained, questions answered and consent signed. Discharge instructions discussed with patient. Patient did sign waiver to use daughter as . Daughter present throughout.   Pt's throat sprayed at 0845, therefore pt will not be able to eat or drink until 2 hours after at 1045.  Informed pt of this time and encouraged to start with warm fluids and soft foods.  Patient is aware she cannot eat or drink anything until after her next procedure. Daughter aware as well.   Pt tolerated procedure well, and was given a total of 100 mcg IV fentanyl and 2 mg IV versed for conscious sedation.  Pt denied throat or chest pain after MARGAUX complete.   MARGAUX probe 58 used for procedure.  Patient was monitored closely until fully awake and VSS. Patient escorted back to gold waiting room for next procedure with daughter present throughout.  Update given to 2A RN.

## 2020-11-05 NOTE — PROGRESS NOTES
Discharge  D:Pt had angiogram and right heart cath.  A/I: Pt 's TR band deflated and removed without incident. Arm board in place. Pt ate and drank. Pt did not void. Pt's PIV removed.Pt did ambulate on unit steadily. Pt's daughter's  Instructed on all  l discharge medications,activity restrictions and post procedural site care. Pt then instructed pt as pt is Malagasy speaking. Pt's daughter verbalized understanding and denied questions or complaints and stated pt had no questions or complaints. Pt assisted getting dressed and escorted to front door via WC with all belongings safely

## 2020-11-05 NOTE — DISCHARGE INSTRUCTIONS
Going Home after an Angio  ______________________________________________      After you go home:    Have an adult stay with you for 24 hours.    Drink plenty of fluids.    You may eat your normal diet, unless your doctor tells you otherwise.    For 24 hours:    Relax and take it easy.    Do NOT smoke.    Do NOT make any important or legal decisions.    Do NOT drive or operate machines at home or at work.    Do NOT drink alcohol.    Remove the Band-Aid after 24 hours. If there is minor oozing, apply another Band-aid and remove it after 12 hours.    For 2 days, do NOT have sex or do any heavy exercise.    Do NOT take a bath, or use a hot tub or pool for at least 3 days. You may shower.            Care of wrist or arm site  It is normal to have soreness at the puncture site and mild tingling in your hand for up to 3 days.    For 2 days, do not use your hand or arm to support your weight (such as rising from a chair) or bend your wrist (such as lifting a garage door).    For 2 days, do not lift more than 5 pounds or exercise your arm (tennis, golf or bowling).    If you start bleeding from the site in your arm:    Sit down and press firmly on the site with your fingers for 10 minutes. Call your doctor as soon as you can.    If the bleeding stops, sit still and keep your wrist straight for 2 hours.    Medicines    If you have started taking Plavix or Effient, do not stop taking it until you talk to your heart doctor (cardiologist).    If you are on metformin (Glucophage), do not restart it until you have blood tests (within 2 to 3 days after discharge). When your doctor tells you it is safe, you may restart the metformin.    If you have stopped any other medicines, check with your nurse or provider about when to restart them.    Call 911 right away if you have bleeding that is heavy or does not stop.    Call your doctor if:    You have a large or growing hard lump around the site.    The site is red, swollen, hot or  tender.    Blood or fluid is draining from the site.    You have chills or a fever greater than 101 F (38 C).    Your leg or arm feels numb or cool.    You have hives, a rash or unusual itching.      [facility] [place]:  [phone] (7 days a week)

## 2020-11-05 NOTE — PROGRESS NOTES
1245  Prep is complete for procedure.  Damon is here for CORS & RHC in prep for valve surgery.  Denies any chest pain or SOB now or at home.  She speaks Bruneian.  She had a MARGAUX this AM & her daughter has been Interpreting for her, however she understands she will not be able to go into the cath lab & to 3C.  CTRN

## 2020-11-05 NOTE — PROGRESS NOTES
Admission  SPARKLE:Pt arrived from CCL after having angio / right heart cath. Pt arrived in SR with TR band on right wrist. Site CDI, no bleeding or hematoma, pulses 2+. Band applied at 1430 with 12 ml of air. Pt instructed on POC and activity restriction through interpreuter phone. Pt's daughter updated. Pt arrived with 1 bag of belongings. Pt given crackers and water. Pt instructed on call light and tv controls.  P:Continue to monitor , start TR deflation at 1630

## 2020-11-06 ENCOUNTER — CARE COORDINATION (OUTPATIENT)
Dept: CARDIOLOGY | Facility: CLINIC | Age: 65
End: 2020-11-06

## 2020-11-06 LAB — INTERPRETATION ECG - MUSE: NORMAL

## 2020-11-06 NOTE — PROGRESS NOTES
Brandon called and findings of recent MARGAUX and Cors/RHC/LHC for Damon were discussed.      Damon's findings will be discussed at the next Valve conference and then this information will be communicated to Brandon and Damon.

## 2020-11-10 ENCOUNTER — CARE COORDINATION (OUTPATIENT)
Dept: CARDIOLOGY | Facility: CLINIC | Age: 65
End: 2020-11-10

## 2020-11-10 NOTE — PROGRESS NOTES
Return telephone call made to Brandon.  She states that two days ago her mom started having itchy skin.  She was wondering if this could be related to the IV contrast she had last Thursday, Nov. 5.  Given the timing of the itchiness, compared to the day of the imaging, it is unlikely that the contrast dye is causing these symptoms.  Brandon verbalized understanding to this information.

## 2020-11-13 ENCOUNTER — CARE COORDINATION (OUTPATIENT)
Dept: CARDIOLOGY | Facility: CLINIC | Age: 65
End: 2020-11-13

## 2020-11-13 DIAGNOSIS — I34.0 MITRAL VALVE INSUFFICIENCY, UNSPECIFIED ETIOLOGY: Primary | ICD-10-CM

## 2020-11-13 NOTE — PROGRESS NOTES
Date: 11/13/2020    Time of Call: 10:55 AM     Diagnosis:  Mitral valve insufficiency     [ TORB ] Ordering provider: Ki Bill MD  Order:   Echo     Order received by: Sheila Quintanilla RN     Follow-up/additional notes:   MARGAUX shows mild to moderate mitral valve insufficiency.  No indications at this time for a MitraClip procedure.  Dr. Bill would like a 6 month follow up in Valve clinic with a TTE prior to the visit.  Continue good medical management, and call if there is a sudden increase in severity of symptoms.      Brandon states that her mom, the pt, has an appt with Dr. Mirza in January 2021.  She would like Dr. Mirza to monitor the progression of the MR and then to make specific referrals back to Dr. Bill and the valve clinic if the MR severity has increased.

## 2020-12-06 DIAGNOSIS — I50.22 CHRONIC SYSTOLIC HEART FAILURE (H): ICD-10-CM

## 2020-12-08 RX ORDER — METOPROLOL SUCCINATE 50 MG/1
100 TABLET, EXTENDED RELEASE ORAL AT BEDTIME
Qty: 90 TABLET | Refills: 0 | Status: SHIPPED | OUTPATIENT
Start: 2020-12-08 | End: 2021-01-17

## 2020-12-08 NOTE — TELEPHONE ENCOUNTER
Prescription approved per [facility] Refill Protocol.  Gege Neely RN

## 2020-12-27 ENCOUNTER — HEALTH MAINTENANCE LETTER (OUTPATIENT)
Age: 65
End: 2020-12-27

## 2021-01-07 DIAGNOSIS — I50.22 CHRONIC SYSTOLIC HEART FAILURE (H): Primary | ICD-10-CM

## 2021-01-16 DIAGNOSIS — I50.22 CHRONIC SYSTOLIC HEART FAILURE (H): ICD-10-CM

## 2021-01-17 RX ORDER — METOPROLOL SUCCINATE 50 MG/1
100 TABLET, EXTENDED RELEASE ORAL AT BEDTIME
Qty: 90 TABLET | Refills: 3 | Status: SHIPPED | OUTPATIENT
Start: 2021-01-17

## 2021-03-06 ENCOUNTER — HEALTH MAINTENANCE LETTER (OUTPATIENT)
Age: 66
End: 2021-03-06

## 2021-03-12 ENCOUNTER — IMMUNIZATION (OUTPATIENT)
Dept: NURSING | Facility: CLINIC | Age: 66
End: 2021-03-12
Payer: MEDICARE

## 2021-03-12 PROCEDURE — 91300 PR COVID VAC PFIZER DIL RECON 30 MCG/0.3 ML IM: CPT

## 2021-03-12 PROCEDURE — 0001A PR COVID VAC PFIZER DIL RECON 30 MCG/0.3 ML IM: CPT

## 2021-04-02 ENCOUNTER — IMMUNIZATION (OUTPATIENT)
Dept: NURSING | Facility: CLINIC | Age: 66
End: 2021-04-02
Attending: INTERNAL MEDICINE
Payer: MEDICARE

## 2021-04-02 PROCEDURE — 0002A PR COVID VAC PFIZER DIL RECON 30 MCG/0.3 ML IM: CPT

## 2021-04-02 PROCEDURE — 91300 PR COVID VAC PFIZER DIL RECON 30 MCG/0.3 ML IM: CPT

## 2021-05-11 ENCOUNTER — NURSE TRIAGE (OUTPATIENT)
Dept: NURSING | Facility: CLINIC | Age: 66
End: 2021-05-11

## 2021-05-11 ENCOUNTER — TELEPHONE (OUTPATIENT)
Dept: NURSING | Facility: CLINIC | Age: 66
End: 2021-05-11

## 2021-05-11 ENCOUNTER — VIRTUAL VISIT (OUTPATIENT)
Dept: URGENT CARE | Facility: CLINIC | Age: 66
End: 2021-05-11
Payer: MEDICARE

## 2021-05-11 DIAGNOSIS — Z78.9 PATIENT TRAVELS: Primary | ICD-10-CM

## 2021-05-11 PROCEDURE — 99441 PR PHYSICIAN TELEPHONE EVALUATION 5-10 MIN: CPT | Mod: 95 | Performed by: EMERGENCY MEDICINE

## 2021-05-11 NOTE — TELEPHONE ENCOUNTER
Daughtercalling to get mother appointment for travel copvid test.  Mother is not with her.    Transferred to scheduling    Larisa Lopes RN  [facility] Nurse Advisor

## 2021-05-11 NOTE — PROGRESS NOTES
Phone appointment:    Assessment: Daughter is assisting this patient in setting up Covid testing for traveling.  Patient is asymptomatic.    Plan: Covid PCR ordered.  Daughter is to explain to schedulers the timing needed for testing.    HPI: Patient is a 66-year-old female who according to daughter is traveling to [place] and needs Covid testing.  She is asymptomatic with no history of exposure.      ROS: See HPI otherwise normal.    Allergies   Allergen Reactions     Lisinopril Cough      Current Outpatient Medications   Medication Sig Dispense Refill     aspirin 81 MG tablet Take 1 tablet (81 mg) by mouth daily 100 tablet 3     Cyanocobalamin (VITAMIN B 12 PO) Take 1,000 mcg by mouth daily       furosemide (LASIX) 20 MG tablet Take 1 tablet (20 mg) by mouth daily 90 tablet 3     losartan (COZAAR) 50 MG tablet TAKE 1 TABLET BY MOUTH EVERY DAY 90 tablet 1     metoprolol succinate ER (TOPROL-XL) 50 MG 24 hr tablet TAKE 2 TABLETS (100 MG) BY MOUTH AT BEDTIME 90 tablet 3     UNABLE TO FIND daily MEDICATION NAME: Vitamin D with something for joints           PE: Deferred.  No acute distress according to daughter.        TREATMENT: None.      ASSESSMENT: Covid testing needed for travel.  Asymptomatic.      DIAGNOSIS: Covid testing needed for travel      PLAN: Covid PCR ordered.  Testing team to follow.    Time: 10 minutes

## 2021-05-12 DIAGNOSIS — Z78.9 PATIENT TRAVELS: ICD-10-CM

## 2021-05-12 DIAGNOSIS — I50.22 CHRONIC SYSTOLIC HEART FAILURE (H): ICD-10-CM

## 2021-05-12 LAB
SARS-COV-2 RNA RESP QL NAA+PROBE: NORMAL
SPECIMEN SOURCE: NORMAL

## 2021-05-12 PROCEDURE — U0003 INFECTIOUS AGENT DETECTION BY NUCLEIC ACID (DNA OR RNA); SEVERE ACUTE RESPIRATORY SYNDROME CORONAVIRUS 2 (SARS-COV-2) (CORONAVIRUS DISEASE [COVID-19]), AMPLIFIED PROBE TECHNIQUE, MAKING USE OF HIGH THROUGHPUT TECHNOLOGIES AS DESCRIBED BY CMS-2020-01-R: HCPCS | Performed by: EMERGENCY MEDICINE

## 2021-05-12 PROCEDURE — U0005 INFEC AGEN DETEC AMPLI PROBE: HCPCS | Performed by: EMERGENCY MEDICINE

## 2021-05-13 LAB
LABORATORY COMMENT REPORT: NORMAL
SARS-COV-2 RNA RESP QL NAA+PROBE: NEGATIVE
SPECIMEN SOURCE: NORMAL

## 2021-07-03 ENCOUNTER — ANCILLARY PROCEDURE (OUTPATIENT)
Dept: GENERAL RADIOLOGY | Facility: CLINIC | Age: 66
End: 2021-07-03
Attending: FAMILY MEDICINE
Payer: MEDICARE

## 2021-07-03 ENCOUNTER — OFFICE VISIT (OUTPATIENT)
Dept: URGENT CARE | Facility: URGENT CARE | Age: 66
End: 2021-07-03
Payer: MEDICARE

## 2021-07-03 VITALS
TEMPERATURE: 100.3 F | HEART RATE: 82 BPM | DIASTOLIC BLOOD PRESSURE: 71 MMHG | BODY MASS INDEX: 29.08 KG/M2 | OXYGEN SATURATION: 99 % | SYSTOLIC BLOOD PRESSURE: 134 MMHG | WEIGHT: 159 LBS

## 2021-07-03 DIAGNOSIS — R05.9 COUGH: ICD-10-CM

## 2021-07-03 DIAGNOSIS — J22 LOWER RESPIRATORY INFECTION: Primary | ICD-10-CM

## 2021-07-03 PROCEDURE — 71046 X-RAY EXAM CHEST 2 VIEWS: CPT | Performed by: RADIOLOGY

## 2021-07-03 PROCEDURE — 99213 OFFICE O/P EST LOW 20 MIN: CPT | Mod: CS | Performed by: FAMILY MEDICINE

## 2021-07-03 RX ORDER — AZITHROMYCIN 250 MG/1
TABLET, FILM COATED ORAL
Qty: 6 TABLET | Refills: 0 | Status: SHIPPED | OUTPATIENT
Start: 2021-07-03 | End: 2021-07-08

## 2021-07-03 RX ORDER — BENZONATATE 100 MG/1
100 CAPSULE ORAL 3 TIMES DAILY PRN
Qty: 30 CAPSULE | Refills: 0 | Status: SHIPPED | OUTPATIENT
Start: 2021-07-03

## 2021-07-03 NOTE — PROGRESS NOTES
Assessment & Plan     Lower respiratory infection  Differentials discussed in detail including lower respiratory tract infection.  Chest x-ray findings reviewed independently which came back unremarkable.  Wells score 0.  Azithromycin and Tessalon prescribed, common side effects discussed.  Suggested to continue with hydration, warm fluids, over-the-counter analgesia.  Patient deferred COVID-19 test.  Recommended to follow-up if symptoms persist or worsen.  All questions answered.  - azithromycin (ZITHROMAX) 250 MG tablet; Take 2 tablets (500 mg) by mouth daily for 1 day, THEN 1 tablet (250 mg) daily for 4 days.  - benzonatate (TESSALON) 100 MG capsule; Take 1 capsule (100 mg) by mouth 3 times daily as needed for cough    Cough  - XR Chest 2 Views; Future  - Symptomatic COVID-19 Virus (Coronavirus) by PCR; Future      Sanford Jiang MD  [facility]    Carlos Jose is a 66 year old who presents for the following health issues  accompanied by her spouse:    HPI     Concern -   Onset: 3 days   Description: dry cough, fever, chills  Intensity: moderate  Progression of Symptoms:  worsening  Accompanying Signs & Symptoms: no chest pain, sob  Previous history of similar problem: [facility]  Therapies tried and outcome: None  Had Covid 19 vaccine in April 2021        Review of Systems   Constitutional, HEENT, cardiovascular, pulmonary, gi and gu systems are negative, except as otherwise noted.      Objective    /71   Pulse 82   Temp 100.3  F (37.9  C) (Tympanic)   Wt 72.1 kg (159 lb)   LMP  (LMP Unknown)   SpO2 99%   BMI 29.08 kg/m    Body mass index is 29.08 kg/m .  Physical Exam   GENERAL: alert and no distress  EYES: Eyes grossly normal to inspection, PERRL and conjunctivae and sclerae normal  HENT: ear canals and TM's normal, nose and mouth without ulcers or lesions  NECK: no adenopathy, no asymmetry, masses, or scars and thyroid normal to palpation  RESP: lungs clear to  auscultation - no rales, rhonchi or wheezes  CV: regular rate and rhythm, normal S1 S2, no S3 or S4, no murmur, click or rub, no peripheral edema and peripheral pulses strong  ABDOMEN: soft, nontender, no hepatosplenomegaly  MS: no gross musculoskeletal defects noted, no edema    Results for orders placed or performed in visit on 07/03/21   XR Chest 2 Views     Status: None    Narrative    XR CHEST 2 VW 7/3/2021 10:40 AM    HISTORY: cough; Cough    COMPARISON: 2/11/2019      Impression    IMPRESSION: No focal infiltrate, pleural effusion or pneumothorax.  Stable mild cardiomegaly. Tortuous aorta with atherosclerotic  calcifications.    JOCELYNE BANKS MD          SYSTEM ID:  [id number]

## 2021-10-04 ENCOUNTER — HEALTH MAINTENANCE LETTER (OUTPATIENT)
Age: 66
End: 2021-10-04

## 2022-01-21 DIAGNOSIS — I50.22 CHRONIC SYSTOLIC HEART FAILURE (H): ICD-10-CM

## 2022-01-24 RX ORDER — LOSARTAN POTASSIUM 50 MG/1
50 TABLET ORAL DAILY
Qty: 30 TABLET | Refills: 0 | Status: SHIPPED | OUTPATIENT
Start: 2022-01-24

## 2022-01-24 NOTE — TELEPHONE ENCOUNTER
Routing refill request to provider for review/approval because:  Labs not current:  Creatinine, potassium  A break in medication  Patient needs to be seen because it has been more than 1 year since last office visit.    Last Written Prescription Date:  4/12/21  Last Fill Quantity: 90,  # refills: 1   Last office visit: 6/9/2020 with prescribing provider:  Dr. George   Future Office Visit:  none      Team Coordinators -  --Please contact patient and ask to schedule an annual visit/physical.      Kamryn Garcia RN  [facility]

## 2022-03-20 ENCOUNTER — HEALTH MAINTENANCE LETTER (OUTPATIENT)
Age: 67
End: 2022-03-20

## 2022-09-11 ENCOUNTER — HEALTH MAINTENANCE LETTER (OUTPATIENT)
Age: 67
End: 2022-09-11

## 2023-02-27 NOTE — TELEPHONE ENCOUNTER
"Requested Prescriptions   Pending Prescriptions Disp Refills     furosemide (LASIX) 20 MG tablet [Pharmacy Med Name: FUROSEMIDE 20 MG TABLET]  Last Written Prescription Date:  10/18/2017  Last Fill Quantity: 90 tablet,  # refills: 3   Last Office Visit: 5/4/2018 [facility]  Future Office Visit:      90 tablet 0     Sig: TAKE 1 TABLET BY MOUTH DAILY.       Diuretics (Including Combos) Protocol Passed - 4/16/2019  6:23 PM        Passed - Blood pressure under 140/90 in past 12 months     BP Readings from Last 3 Encounters:   02/11/19 133/70   05/19/18 136/59   05/04/18 116/67                 Passed - Recent (12 mo) or future (30 days) visit within the authorizing provider's specialty     Patient had office visit in the last 12 months or has a visit in the next 30 days with authorizing provider or within the authorizing provider's specialty.  See \"Patient Info\" tab in inbasket, or \"Choose Columns\" in Meds & Orders section of the refill encounter.              Passed - Medication is active on med list        Passed - Patient is age 18 or older        Passed - No active pregancy on record        Passed - Normal serum creatinine on file in past 12 months     Recent Labs   Lab Test 05/18/18  2354   CR 0.84              Passed - Normal serum potassium on file in past 12 months     Recent Labs   Lab Test 05/18/18  2354   POTASSIUM 4.1                    Passed - Normal serum sodium on file in past 12 months     Recent Labs   Lab Test 05/18/18  2354                 Passed - No positive pregnancy test in past 12 months        " STOMACH BUG.

## 2023-04-22 ENCOUNTER — HEALTH MAINTENANCE LETTER (OUTPATIENT)
Age: 68
End: 2023-04-22

## 2024-04-14 ENCOUNTER — HEALTH MAINTENANCE LETTER (OUTPATIENT)
Age: 69
End: 2024-04-14

## 2024-06-23 ENCOUNTER — HEALTH MAINTENANCE LETTER (OUTPATIENT)
Age: 69
End: 2024-06-23

## 2025-05-02 NOTE — TELEPHONE ENCOUNTER
Daughter Brandon called back returning call for her mother. I gave her the message below and she will make a appt for her mother for her cardiologist  Dr. Mirza.  Mary Hansen RN    
I called pt through [employer] phone     Attempted to reach, unable. Left message  to call back to schedule appt with PCP Aileen Pizano CNP and that her prescription was refilled for her.   .  Susanna Park RN    
Please call this patient.  It is very important that she maintain regular clinic appointments and follow up appointments with cardiology.  I did refill her losartan today but she needs to be seen by cardiology for additional refills and labs.  I will also route this to the cardiologist in hopes that his team will help her schedule that follow up appointment.  Thank you.    
Routing refill request to provider for review/approval because:  Medication written as taken differently. Please review and advise.          Cozaar     Last Written Prescription Date: 3/9/2016  Last Fill Quantity: 90, # refills: 3  Last Office [place] with [facility] [place] or [facility] prescribing provider: 3/9/2016       Potassium   Date Value Ref Range Status   05/18/2016 4.2 3.4 - 5.3 mmol/L Final     Creatinine   Date Value Ref Range Status   05/18/2016 0.73 0.52 - 1.04 mg/dL Final     BP Readings from Last 3 Encounters:   02/17/17 125/68   12/19/16 133/79   05/16/16 138/64       
Yes

## (undated) DEVICE — SLEEVE TR BAND RADIAL COMPRESSION DEVICE 24CM TRB24-REG

## (undated) DEVICE — PACK HEART LEFT CUSTOM

## (undated) DEVICE — Device

## (undated) DEVICE — FASTENER CATH BALLOON CLAMPX2 STATLOCK 0684-00-493

## (undated) DEVICE — MANIFOLD KIT ANGIO AUTOMATED 014613

## (undated) DEVICE — INTRO GLIDESHEATH SLENDER 6FR 10X45CM 60-1060

## (undated) DEVICE — TUBING PRESSURE 30"

## (undated) DEVICE — INTRO SHEATH 7FRX10CM PINNACLE RSS702

## (undated) DEVICE — KIT HAND CONTROL ACIST 014644 AR-P54

## (undated) RX ORDER — ASPIRIN 325 MG
TABLET ORAL
Status: DISPENSED
Start: 2020-11-05

## (undated) RX ORDER — SODIUM CHLORIDE 9 MG/ML
INJECTION, SOLUTION INTRAVENOUS
Status: DISPENSED
Start: 2020-11-05

## (undated) RX ORDER — NITROGLYCERIN 5 MG/ML
VIAL (ML) INTRAVENOUS
Status: DISPENSED
Start: 2020-11-05

## (undated) RX ORDER — FENTANYL CITRATE 50 UG/ML
INJECTION, SOLUTION INTRAMUSCULAR; INTRAVENOUS
Status: DISPENSED
Start: 2020-11-05

## (undated) RX ORDER — LIDOCAINE HYDROCHLORIDE 20 MG/ML
SOLUTION OROPHARYNGEAL
Status: DISPENSED
Start: 2020-11-05

## (undated) RX ORDER — HEPARIN SODIUM 1000 [USP'U]/ML
INJECTION, SOLUTION INTRAVENOUS; SUBCUTANEOUS
Status: DISPENSED
Start: 2020-11-05

## (undated) RX ORDER — ONDANSETRON 2 MG/ML
INJECTION INTRAMUSCULAR; INTRAVENOUS
Status: DISPENSED
Start: 2020-11-05